# Patient Record
Sex: FEMALE | Race: WHITE | Employment: UNEMPLOYED | URBAN - METROPOLITAN AREA
[De-identification: names, ages, dates, MRNs, and addresses within clinical notes are randomized per-mention and may not be internally consistent; named-entity substitution may affect disease eponyms.]

---

## 2020-11-09 ENCOUNTER — VIRTUAL VISIT (OUTPATIENT)
Dept: HEMATOLOGY | Age: 50
End: 2020-11-09

## 2020-11-09 DIAGNOSIS — R74.8 ELEVATED LIVER ENZYMES: Primary | ICD-10-CM

## 2020-11-09 PROBLEM — F41.9 ANXIETY: Status: ACTIVE | Noted: 2020-11-09

## 2020-11-09 PROBLEM — K21.9 GERD (GASTROESOPHAGEAL REFLUX DISEASE): Status: ACTIVE | Noted: 2020-11-09

## 2020-11-09 PROBLEM — Z90.49 HISTORY OF CHOLECYSTECTOMY: Status: ACTIVE | Noted: 2020-11-09

## 2020-11-09 PROBLEM — I10 HYPERTENSION: Status: ACTIVE | Noted: 2020-11-09

## 2020-11-09 PROBLEM — K43.9 VENTRAL HERNIA: Status: ACTIVE | Noted: 2020-11-09

## 2020-11-09 PROBLEM — J45.909 ASTHMA: Status: ACTIVE | Noted: 2020-11-09

## 2020-11-09 PROCEDURE — 99203 OFFICE O/P NEW LOW 30 MIN: CPT | Performed by: INTERNAL MEDICINE

## 2020-11-09 RX ORDER — ESOMEPRAZOLE MAGNESIUM 40 MG/1
CAPSULE, DELAYED RELEASE ORAL
COMMUNITY
Start: 2020-10-15

## 2020-11-09 RX ORDER — LOSARTAN POTASSIUM 100 MG/1
100 TABLET ORAL DAILY
COMMUNITY
Start: 2020-10-21

## 2020-11-09 RX ORDER — PAROXETINE HYDROCHLORIDE 20 MG/1
40 TABLET, FILM COATED ORAL DAILY
COMMUNITY
Start: 2020-08-18

## 2020-11-09 RX ORDER — DEXAMETHASONE 4 MG/1
TABLET ORAL DAILY
COMMUNITY
Start: 2020-11-02

## 2020-11-09 RX ORDER — AMLODIPINE BESYLATE 5 MG/1
5 TABLET ORAL DAILY
COMMUNITY

## 2020-11-09 RX ORDER — LANOLIN ALCOHOL/MO/W.PET/CERES
325 CREAM (GRAM) TOPICAL DAILY
COMMUNITY
End: 2021-02-23

## 2020-11-09 NOTE — PROGRESS NOTES
VIRTUAL TELEHEALTH VISIT PERFORMED DUE TO COVID-19 EPIDEMIC    CONSENT:  Bishop Cohen, who was seen by synchronous, real-time, audio-video technology, and/or her healthcare decision maker, is aware that this patient-initiated, Telehealth encounter on 11/9/2020 is a billable service, with coverage as determined by her insurance carrier. She is aware that she may receive a bill and has provided verbal consent to proceed. This patient was evaluated during a Virtual Telehealth visit. A caregiver was present if appropriate.  Due to this being a TeleHealth encounter performed during the [facility] public health emergency, the physical examination was limited to that listed in the [facility] Chaz Jones MD, [address], [place], Santhosh Lopez MD, MPH      SALO Blanco, ACNP-BC     April S Luis Enrique, AGPCNP-BC   Allie Guerra, [address], [facility]       [facility] 136    at [address], [facility] 22.    [phone]    FAX: [facility]    at [address], [address]    [phone]    FAX: [phone]       Patient Care Team:  Dutch John Cue as PCP - General (Physician Assistant)  Dutch John Cue (Physician Assistant)      Problem List  Date Reviewed: 11/9/2020          Codes Class Noted    Elevated liver enzymes ICD-10-CM: R74.8  ICD-9-CM: 790.5  11/9/2020        History of cholecystectomy ICD-10-CM: Z90.49  ICD-9-CM: V45.79  11/9/2020        Hypertension ICD-10-CM: I10  ICD-9-CM: 401.9  11/9/2020        Ventral hernia ICD-10-CM: K43.9  ICD-9-CM: 553.20  11/9/2020        Asthma ICD-10-CM: J45.909  ICD-9-CM: 493.90  11/9/2020        GERD (gastroesophageal reflux disease) ICD-10-CM: K21.9  ICD-9-CM: 530.81  11/9/2020        Anxiety ICD-10-CM: F41.9  ICD-9-CM: 300.00  11/9/2020              The clinicians listed above have asked me to see Anna Galo in consultation regarding elevated liver enzymes and its management. All medical records sent by the referring physicians were reviewed including imaging studies     The patient is a 44 y.o.  female who was found to have elevated  liver enzymes in 2015      Serologic evaluation for markers of chronic liver disease was positive for AMA,     MRI of the liver was performed in 9/2020. The results of the imaging suggested fatty liver disease. An assessment of liver fibrosis with biopsy or elastography has not been performed. The patient has the following symptoms which are thought to be due to the liver disease:  fatigue, pain in the right side over the liver, dry mouth, arthralgias, myalgias, itching, swelling of the abdomen,     The patient is not currently experiencing the following symptoms of liver disease:  dry eyes, dry mouth, arthralgias, myalgias, swelling of the lower extremities, hematemesis, hematochezia. The patient has Moderate limitations in functional activities which can be attributed to the liver disease. ASSESSMENT AND PLAN:  Elevated liver enzymes  The patient states she has elevated liver enzymes but there are no liver enzyme results present in the EMR or Care Everywhere. Serologic testing for causes of chronic liver disease were positive for AMA with high titer. The most likely causes for the liver chemistry abnormalities were discussed with the patient and include   Primary Biliary Cholangitis    The need to perform an assessment of liver fibrosis was discussed with the patient. The Fibroscan can assess liver fibrosis and determine if a patient has advanced fibrosis or cirrhosis without the need for liver biopsy. This will be performed at the next office visit. If the Fibroscan suggests advanced fibrosis then a liver biopsy should be considered. The Fibroscan can be repeated annually or as often as clinically indicated to assess for fibrosis progression and/or regression. Will perform laboratory testing to monitor liver function and degree of liver injury. This included BMP, hepatic panel, CBC with platelet count, INR. Screening for Hepatocellular Carcinoma  HCC screening is not necessary if the patient has no evidence of cirrhosis. Treatment of other medical problems in patients with chronic liver disease  There are no contraindications for the patient to take most medications that are necessary for treatment of other medical issues. Counseling for alcohol in patients with chronic liver disease  The patient was counseled regarding alcohol consumption and the effect of alcohol on chronic liver disease. The patient does not consume any significant amount of alcohol. Vaccinations   The need for vaccination against viral hepatitis A and B will be assessed with serologic and instituted as appropriate. Routine vaccinations against other bacterial and viral agents can be performed as indicated. Annual flu vaccination should be administered if indicated. ALLERGIES  Allergies not on file    MEDICATIONS  Current Outpatient Medications   Medication Sig    amLODIPine (NORVASC) 5 mg tablet Take 5 mg by mouth daily.  esomeprazole (NEXIUM) 40 mg capsule TAKE 1 CAPSULE BY MOUTH EVERY DAY    ferrous sulfate 325 mg (65 mg iron) tablet Take 325 mg by mouth daily.  Flovent  mcg/actuation inhaler     losartan (COZAAR) 100 mg tablet     PARoxetine (PAXIL) 20 mg tablet      No current facility-administered medications for this visit. SYSTEM REVIEW NOT RELATED TO LIVER DISEASE OR REVIEWED ABOVE:  Constitution systems: Negative for fever, chills, weight gain, weight loss. Eyes: Negative for visual changes.   ENT: Negative for sore throat, painful swallowing. Respiratory: Negative for cough, hemoptysis, SOB. Cardiology: Negative for chest pain, palpitations. GI:  Negative for constipation or diarrhea. : Negative for urinary frequency, dysuria, hematuria, nocturia. Skin: Negative for rash. Hematology: Negative for easy bruising, blood clots. Musculo-skelatal: Negative for back pain, muscle pain, weakness. Neurologic: Negative for headaches, dizziness, vertigo, memory problems not related to HE. Psychology: Negative for anxiety, depression. FAMILY HISTORY:  The father  of cirrhosis. The mother  of dementia. There is no family history of liver disease. SOCIAL HISTORY:  The patient is . The patient has 2 children, 1 adopted child,   The patient has never used tobacco products. The patient has never consumed significant amounts of alcohol. The patient currently works part time as . PHYSICAL EXAMINATION PERFORMED BY [employer]:  VS: Not performed   General: No acute distress. Eyes: Sclera anicteric. ENT: No oral lesions. Skin: No rashes. spider angiomata. No jaundice. Abdomen: No obvious distention suggesting ascites. Extremities: No edema. No muscle wasting. Neurologic: Alert and oriented. Cranial nerves grossly intact. LABORATORY STUDIES:  From 2020  AST/ALT/ALP/T Bili/ALB:  NA  WBC/HB/PLT/INR:  15/13.1/371  NA/BUN/CREAT:  12/0.6    SEROLOGIES:  2015. HAV total negative, positive, HBsAntigen negative, anti-HCV negative, Ferritin 15, iron saturation 12%, ASMA negative, AMA strongly positive 110, ceruloplsmin 28, alpha-1-antitrypsin 159, TSH 2.46, T4 free 1.2    LIVER HISTOLOGY:  Not available or performed    ENDOSCOPIC PROCEDURES:  Not available or performed    RADIOLOGY:  2020. Dynamic MRI of th liver. Changes consistent with fatty liver. No liver mass lesions. Normal spleen. No ascites.     OTHER TESTING:  Not available or performed    FOLLOW-UP AFTER VIRTUAL VISIT:  Pursuant to the emergency declaration under the [facility] 1135 waiver authority and the [employer] Act, this Virtual  Visit was conducted, with the patient's (and/or their legal guardian's) consent, to reduce the patient's risk of exposure to COVID-19 and provide necessary medical care. Services were provided through a video synchronous discussion virtually to substitute for an in-person clinic visit. The patient was located in their home. The provider was located in the [place] [address] office. All of the issues listed above in the Assessment and Plan were discussed with the patient. All questions were answered. The patient expressed a clear understanding of the above. Orders to obtain laboratory testing will be mailed to the patient and obtained 1 week prior to the next TeleHealth or in-person encounter. An in-person follow-up visit will be scheduled at [place] [address] in 4 weeks for Fibroscan to review all data and determine the treatment plan.       Elzbieta Gray MD  [address] [place] [address] Theresa Thapa, [address]  12 [facility]

## 2020-12-01 ENCOUNTER — HOSPITAL ENCOUNTER (OUTPATIENT)
Dept: LAB | Age: 50
Discharge: HOME | End: 2020-12-01
Payer: COMMERCIAL

## 2020-12-01 DIAGNOSIS — R74.8 ELEVATED LIVER ENZYMES: ICD-10-CM

## 2020-12-01 LAB
ALBUMIN SERPL-MCNC: 3.9 G/DL (ref 3.4–5)
ALBUMIN/GLOB SERPL: 1 {RATIO} (ref 0.8–1.7)
ALP SERPL-CCNC: 193 U/L (ref 45–117)
ALT SERPL-CCNC: 72 U/L (ref 13–56)
ANION GAP SERPL CALC-SCNC: 10 MMOL/L (ref 3–18)
AST SERPL-CCNC: 71 U/L (ref 10–38)
BASOPHILS # BLD: 0.1 K/UL (ref 0–0.1)
BASOPHILS NFR BLD: 1 % (ref 0–2)
BILIRUB DIRECT SERPL-MCNC: 0.2 MG/DL (ref 0–0.2)
BILIRUB SERPL-MCNC: 0.6 MG/DL (ref 0.2–1)
BUN SERPL-MCNC: 11 MG/DL (ref 7–18)
BUN/CREAT SERPL: 16 (ref 12–20)
CALCIUM SERPL-MCNC: 9.8 MG/DL (ref 8.5–10.1)
CHLORIDE SERPL-SCNC: 107 MMOL/L (ref 100–111)
CO2 SERPL-SCNC: 23 MMOL/L (ref 21–32)
CREAT SERPL-MCNC: 0.67 MG/DL (ref 0.6–1.3)
DIFFERENTIAL METHOD BLD: NORMAL
EOSINOPHIL # BLD: 0.3 K/UL (ref 0–0.4)
EOSINOPHIL NFR BLD: 3 % (ref 0–5)
ERYTHROCYTE [DISTWIDTH] IN BLOOD BY AUTOMATED COUNT: 14.2 % (ref 11.6–14.5)
FERRITIN SERPL-MCNC: 191 NG/ML (ref 8–388)
GLOBULIN SER CALC-MCNC: 4 G/DL (ref 2–4)
GLUCOSE SERPL-MCNC: 97 MG/DL (ref 74–99)
HCT VFR BLD AUTO: 43.3 % (ref 35–45)
HGB BLD-MCNC: 14 G/DL (ref 12–16)
INR PPP: 1 (ref 0.8–1.2)
IRON SATN MFR SERPL: 23 % (ref 20–50)
IRON SERPL-MCNC: 84 UG/DL (ref 50–175)
LYMPHOCYTES # BLD: 3.2 K/UL (ref 0.9–3.6)
LYMPHOCYTES NFR BLD: 30 % (ref 21–52)
MCH RBC QN AUTO: 28.8 PG (ref 24–34)
MCHC RBC AUTO-ENTMCNC: 32.3 G/DL (ref 31–37)
MCV RBC AUTO: 89.1 FL (ref 74–97)
MONOCYTES # BLD: 0.8 K/UL (ref 0.05–1.2)
MONOCYTES NFR BLD: 8 % (ref 3–10)
NEUTS SEG # BLD: 6.2 K/UL (ref 1.8–8)
NEUTS SEG NFR BLD: 58 % (ref 40–73)
PLATELET # BLD AUTO: 390 K/UL (ref 135–420)
PMV BLD AUTO: 10.3 FL (ref 9.2–11.8)
POTASSIUM SERPL-SCNC: 3.9 MMOL/L (ref 3.5–5.5)
PROT SERPL-MCNC: 7.9 G/DL (ref 6.4–8.2)
PROTHROMBIN TIME: 12.6 SEC (ref 11.5–15.2)
RBC # BLD AUTO: 4.86 M/UL (ref 4.2–5.3)
SODIUM SERPL-SCNC: 140 MMOL/L (ref 136–145)
TIBC SERPL-MCNC: 359 UG/DL (ref 250–450)
WBC # BLD AUTO: 10.6 K/UL (ref 4.6–13.2)

## 2020-12-01 PROCEDURE — 80048 BASIC METABOLIC PNL TOTAL CA: CPT

## 2020-12-01 PROCEDURE — 85610 PROTHROMBIN TIME: CPT

## 2020-12-01 PROCEDURE — 86038 ANTINUCLEAR ANTIBODIES: CPT

## 2020-12-01 PROCEDURE — 85025 COMPLETE CBC W/AUTO DIFF WBC: CPT

## 2020-12-01 PROCEDURE — 80076 HEPATIC FUNCTION PANEL: CPT

## 2020-12-01 PROCEDURE — 86706 HEP B SURFACE ANTIBODY: CPT

## 2020-12-01 PROCEDURE — 83516 IMMUNOASSAY NONANTIBODY: CPT

## 2020-12-01 PROCEDURE — 86704 HEP B CORE ANTIBODY TOTAL: CPT

## 2020-12-01 PROCEDURE — 36415 COLL VENOUS BLD VENIPUNCTURE: CPT

## 2020-12-01 PROCEDURE — 83540 ASSAY OF IRON: CPT

## 2020-12-01 PROCEDURE — 86708 HEPATITIS A ANTIBODY: CPT

## 2020-12-01 PROCEDURE — 82728 ASSAY OF FERRITIN: CPT

## 2020-12-02 LAB
HBV SURFACE AB SER QL IA: NEGATIVE
HBV SURFACE AB SERPL IA-ACNC: <3.1 MIU/ML
HEP BS AB COMMENT,HBSAC: ABNORMAL

## 2020-12-03 LAB
HAV AB SER QL IA: NEGATIVE
HBV CORE AB SERPL QL IA: NEGATIVE
MITOCHONDRIA M2 IGG SER-ACNC: 181.3 UNITS (ref 0–20)

## 2020-12-04 LAB — ANA TITR SER IF: NEGATIVE {TITER}

## 2020-12-15 ENCOUNTER — OFFICE VISIT (OUTPATIENT)
Dept: HEMATOLOGY | Age: 50
End: 2020-12-15
Payer: COMMERCIAL

## 2020-12-15 VITALS
WEIGHT: 284.38 LBS | DIASTOLIC BLOOD PRESSURE: 99 MMHG | HEART RATE: 98 BPM | OXYGEN SATURATION: 98 % | TEMPERATURE: 98.1 F | SYSTOLIC BLOOD PRESSURE: 133 MMHG

## 2020-12-15 DIAGNOSIS — K74.3 PRIMARY BILIARY CHOLANGITIS (HCC): Primary | ICD-10-CM

## 2020-12-15 PROCEDURE — 99214 OFFICE O/P EST MOD 30 MIN: CPT | Performed by: INTERNAL MEDICINE

## 2020-12-15 PROCEDURE — 91200 LIVER ELASTOGRAPHY: CPT | Performed by: INTERNAL MEDICINE

## 2020-12-15 RX ORDER — FEXOFENADINE HYDROCHLORIDE AND PSEUDOEPHEDRINE HYDROCHLORIDE 180; 240 MG/1; MG/1
1 TABLET, FILM COATED, EXTENDED RELEASE ORAL DAILY
COMMUNITY

## 2020-12-15 NOTE — Clinical Note
12/23/2020 Patient: Su Dinero YOB: 1970 Date of Visit: 12/15/2020 Andrew Darby PA-C 
[address] Fax: [phone] Dear Andrew Darby PA-C, Thank you for referring Ms. Su Dinero to [facility] [place] [facility]"" Katiuska Chopra for evaluation. My notes for this consultation are attached. If you have questions, please do not hesitate to call me. I look forward to following your patient along with you. Sincerely, Bhavana Gautam MD

## 2020-12-15 NOTE — PROGRESS NOTES
Yann Gray [address]      Tawanna Izaguirre MD, Kiera Day, Gabriela Bunch MD, MPH      Makayla Zaragoza, SALO Corral, [facility]-BC     April S Luis Enrique, [facility]   Ann Cruz, P-C    Tristen Benson, [facility]       Felecia Melaniesusan [facility] De Garcia 136    at [address], [address] [place] [address], [facility] 22.    [phone]    FAX: [address] [place] [address], [address]    [phone]    FAX: [phone]       Patient Care Team:  Brett Guerrero as PCP - General (Physician Assistant)      Problem List  Date Reviewed: 11/9/2020          Codes Class Noted    Primary biliary cholangitis [facility]) ICD-10-CM: K74.3  ICD-9-CM: 571.6  11/9/2020        History of cholecystectomy ICD-10-CM: Z90.49  ICD-9-CM: V45.79  11/9/2020        Hypertension ICD-10-CM: I10  ICD-9-CM: 401.9  11/9/2020        Ventral hernia ICD-10-CM: K43.9  ICD-9-CM: 553.20  11/9/2020        Asthma ICD-10-CM: J45.909  ICD-9-CM: 493.90  11/9/2020        GERD (gastroesophageal reflux disease) ICD-10-CM: K21.9  ICD-9-CM: 530.81  11/9/2020        Anxiety ICD-10-CM: F41.9  ICD-9-CM: 300.00  11/9/2020              Greyson Gutierrez [facility] for management of Primary Biliary Cholangitis. The active problem list, all pertinent past medical history, medications, radiologic findings and laboratory findings related to the liver disorder were reviewed with the patient. The patient is a 44 y.o.  female who was found to have elevated  liver enzymes in 2015      Serologic evaluation for markers of chronic liver disease was positive for AMA,     MRI of the liver was performed in 9/2020. The results of the imaging suggested fatty liver disease.       Assessment of liver fibrosis with Fibroscan was performed in the office today. The result was 20.7 kPa which correlates with cirrhosis. The CAP score of 337 suggests fatty liver. The patient has the following symptoms which are thought to be due to the liver disease:  fatigue, pain in the right side over the liver, dry mouth, arthralgias, myalgias, itching, swelling of the abdomen,     The patient is not currently experiencing the following symptoms of liver disease:  dry eyes, dry mouth, arthralgias, myalgias, swelling of the lower extremities, hematemesis, hematochezia. The patient has Moderate limitations in functional activities which can be attributed to the liver disease. ASSESSMENT AND PLAN:  Primary Biliary Cholangitis  The diagnosis is based upon serology, and an elevation in ALP. A liver biopsy will be scheduled. Assessment of liver fibrosis was performed with Fibroscan in 12/2020. The result was 20.7 kPa which correlates with cirrhosis. The CAP score of 337 suggested this may be due to fatty liver. Liver transaminases are elevated. ALP is elevated. Liver function is normal.  The platelet count is normal.      Based upon laboratory studies Fibroscan, and imaging the patient may have advanced liver disease. Since there is discordance between labs and Fibroscan will need to perform a liver biopsy to help determine the cause and severity of the liver test abnormalities. The risks of performing the liver biopsy including pain, puncture of the lung, gallbladder, intestine or kidney and bleeding were discussed. The patient has decided to have a liver biopsy. This will be scheduled. Will hold off on starting ISABELLE until after the liver biopsy. Screening for Hepatocellular Carcinoma  HCC screening is not necessary if the patient has no evidence of cirrhosis.     Treatment of other medical problems in patients with chronic liver disease  There are no contraindications for the patient to take most medications that are necessary for treatment of other medical issues. Counseling for alcohol in patients with chronic liver disease  The patient was counseled regarding alcohol consumption and the effect of alcohol on chronic liver disease. The patient does not consume any significant amount of alcohol. Vaccinations   The need for vaccination against viral hepatitis A and B will be assessed with serologic and instituted as appropriate. Routine vaccinations against other bacterial and viral agents can be performed as indicated. Annual flu vaccination should be administered if indicated. ALLERGIES  Not on File    MEDICATIONS  Current Outpatient Medications   Medication Sig    fexofenadine-pseudoephedrine (Allegra-D 24 Hour) 180-240 mg per tablet Take 1 Tab by mouth daily.  amLODIPine (NORVASC) 5 mg tablet Take 5 mg by mouth daily.  esomeprazole (NEXIUM) 40 mg capsule TAKE 1 CAPSULE BY MOUTH EVERY DAY    ferrous sulfate 325 mg (65 mg iron) tablet Take 325 mg by mouth daily.  Flovent  mcg/actuation inhaler     losartan (COZAAR) 100 mg tablet     PARoxetine (PAXIL) 20 mg tablet 40 mg. No current facility-administered medications for this visit. SYSTEM REVIEW NOT RELATED TO LIVER DISEASE OR REVIEWED ABOVE:  Constitution systems: Negative for fever, chills, weight gain, weight loss. Eyes: Negative for visual changes. ENT: Negative for sore throat, painful swallowing. Respiratory: Negative for cough, hemoptysis, SOB. Cardiology: Negative for chest pain, palpitations. GI:  Negative for constipation or diarrhea. : Negative for urinary frequency, dysuria, hematuria, nocturia. Skin: Negative for rash. Hematology: Negative for easy bruising, blood clots. Musculo-skelatal: Negative for back pain, muscle pain, weakness. Neurologic: Negative for headaches, dizziness, vertigo, memory problems not related to HE. Psychology: Negative for anxiety, depression. FAMILY HISTORY:  The father  of cirrhosis. The mother  of dementia. There is no family history of liver disease. SOCIAL HISTORY:  The patient is . The patient has 2 children, 1 adopted child,   The patient has never used tobacco products. The patient has never consumed significant amounts of alcohol. The patient currently works part time as . PHYSICAL EXAMINATION PERFORMED BY VIRTUAL TELEHEALTH:  VS: Not performed   General: No acute distress. Eyes: Sclera anicteric. ENT: No oral lesions. Skin: No rashes. spider angiomata. No jaundice. Abdomen: No obvious distention suggesting ascites. Extremities: No edema. No muscle wasting. Neurologic: Alert and oriented. Cranial nerves grossly intact. LABORATORY STUDIES:  Liver [place] [address] & Units 2020   WBC 4.6 - 13.2 K/uL 10.6   ANC 1.8 - 8.0 K/UL 6.2   HGB 12.0 - 16.0 g/dL 14.0    - 420 K/uL 390   INR 0.8 - 1.2   1.0   AST 10 - 38 U/L 71 (H)   ALT 13 - 56 U/L 72 (H)   Alk Phos 45 - 117 U/L 193 (H)   Bili, Total 0.2 - 1.0 MG/DL 0.6   Bili, Direct 0.0 - 0.2 MG/DL 0.2   Albumin 3.4 - 5.0 g/dL 3.9   BUN 7.0 - 18 MG/DL 11   Creat 0.6 - 1.3 MG/DL 0.67   Na 136 - 145 mmol/L 140   K 3.5 - 5.5 mmol/L 3.9   Cl 100 - 111 mmol/L 107   CO2 21 - 32 mmol/L 23   Glucose 74 - 99 mg/dL 97       SEROLOGIES:  2015.   HAV total negative, positive, HBsAntigen negative, anti-HCV negative, Ferritin 15, iron saturation 12%, ASMA negative, AMA strongly positive 110, ceruloplsmin 28, alpha-1-antitrypsin 159, TSH 2.46, T4 free 1.2    Serologies Latest Ref Rng & Units 2020   Hep A Ab, Total Negative   Negative   Hep B Core Ab, Total Negative   Negative   Hep B Surface Ab >10.0 mIU/mL <3.10 (L)   Hep B Surface Ab Interp POS   Negative (A)   Ferritin 8 - 388 NG/   Iron % Saturation 20 - 50 % 23   VALERIA, IFA  Negative   M2 Ab 0.0 - 20.0 Units 181.3 (H)     LIVER HISTOLOGY:  12/20202. FibroScan performed at [address]. EkPa was 20.7. IQR/med 22%. . The results suggested a fibrosis level of F4. The CAP score suggests fatty liver    ENDOSCOPIC PROCEDURES:  Not available or performed    RADIOLOGY:  5/2018. Ultrasound of liver. Echogenic consistent with fatty liver. No liver mass lesions. No dilated bile ducts. No ascites. 9/2020. CT scan abdomen with IV contrast.  Changes consistent with fatty liver. No liver mass lesions. Normal appearing liver with areas of focal fat. Normal spleen. No ascites. 9/2020. Dynamic MRI of th liver. Changes consistent with fatty liver. No liver mass lesions. Normal spleen. No ascites. OTHER TESTING:  Not available or performed    FOLLOW-UP:  All of the issues listed above in the Assessment and Plan were discussed with the patient. All questions were answered. The patient expressed a clear understanding of the above. [facility] 87 in 2 weeks after liver biopsy.       Jane Molina MD  [address]  4 [facility], [address] [place] [address], [address] [place] [address] Street [address]  12 [facility]

## 2020-12-23 PROBLEM — K74.3 PRIMARY BILIARY CHOLANGITIS (HCC): Status: ACTIVE | Noted: 2020-11-09

## 2020-12-28 DIAGNOSIS — Z01.812 PRE-PROCEDURE LAB EXAM: Primary | ICD-10-CM

## 2021-01-07 ENCOUNTER — HOSPITAL ENCOUNTER (OUTPATIENT)
Dept: PREADMISSION TESTING | Age: 51
Discharge: HOME | End: 2021-01-07
Payer: COMMERCIAL

## 2021-01-07 PROCEDURE — 87635 SARS-COV-2 COVID-19 AMP PRB: CPT

## 2021-01-08 LAB — SARS-COV-2, COV2NT: NOT DETECTED

## 2021-01-13 ENCOUNTER — HOSPITAL ENCOUNTER (OUTPATIENT)
Age: 51
Setting detail: OUTPATIENT SURGERY
Discharge: HOME | End: 2021-01-13
Attending: INTERNAL MEDICINE | Admitting: INTERNAL MEDICINE
Payer: COMMERCIAL

## 2021-01-13 ENCOUNTER — HOSPITAL ENCOUNTER (OUTPATIENT)
Dept: ULTRASOUND IMAGING | Age: 51
Discharge: HOME | End: 2021-01-13
Attending: INTERNAL MEDICINE
Payer: COMMERCIAL

## 2021-01-13 VITALS
SYSTOLIC BLOOD PRESSURE: 144 MMHG | HEART RATE: 70 BPM | BODY MASS INDEX: 44.27 KG/M2 | WEIGHT: 282.06 LBS | DIASTOLIC BLOOD PRESSURE: 85 MMHG | HEIGHT: 67 IN | RESPIRATION RATE: 16 BRPM | OXYGEN SATURATION: 97 % | TEMPERATURE: 97.6 F

## 2021-01-13 DIAGNOSIS — R79.89 ELEVATED LFTS: ICD-10-CM

## 2021-01-13 DIAGNOSIS — K74.3 PRIMARY BILIARY CHOLANGITIS (HCC): ICD-10-CM

## 2021-01-13 LAB — HCG UR QL: NEGATIVE

## 2021-01-13 PROCEDURE — 76705 ECHO EXAM OF ABDOMEN: CPT

## 2021-01-13 PROCEDURE — 88313 SPECIAL STAINS GROUP 2: CPT

## 2021-01-13 PROCEDURE — 2709999900 HC NON-CHARGEABLE SUPPLY: Performed by: INTERNAL MEDICINE

## 2021-01-13 PROCEDURE — 74011250636 HC RX REV CODE- 250/636: Performed by: INTERNAL MEDICINE

## 2021-01-13 PROCEDURE — 76942 ECHO GUIDE FOR BIOPSY: CPT | Performed by: INTERNAL MEDICINE

## 2021-01-13 PROCEDURE — 47000 NEEDLE BIOPSY OF LIVER PERQ: CPT | Performed by: INTERNAL MEDICINE

## 2021-01-13 PROCEDURE — 77030013826 HC NDL BIOP MAXCOR BARD -B: Performed by: INTERNAL MEDICINE

## 2021-01-13 PROCEDURE — 81025 URINE PREGNANCY TEST: CPT

## 2021-01-13 PROCEDURE — 76040000019: Performed by: INTERNAL MEDICINE

## 2021-01-13 PROCEDURE — 74011000250 HC RX REV CODE- 250: Performed by: INTERNAL MEDICINE

## 2021-01-13 PROCEDURE — 88307 TISSUE EXAM BY PATHOLOGIST: CPT

## 2021-01-13 RX ORDER — LIDOCAINE HYDROCHLORIDE 10 MG/ML
10 INJECTION INFILTRATION; PERINEURAL ONCE
Status: COMPLETED | OUTPATIENT
Start: 2021-01-13 | End: 2021-01-13

## 2021-01-13 RX ORDER — HYDROMORPHONE HYDROCHLORIDE 2 MG/ML
1 INJECTION, SOLUTION INTRAMUSCULAR; INTRAVENOUS; SUBCUTANEOUS
Status: DISCONTINUED | OUTPATIENT
Start: 2021-01-13 | End: 2021-01-13 | Stop reason: HOSPADM

## 2021-01-13 RX ORDER — ONDANSETRON 2 MG/ML
4 INJECTION INTRAMUSCULAR; INTRAVENOUS
Status: DISCONTINUED | OUTPATIENT
Start: 2021-01-13 | End: 2021-01-13 | Stop reason: HOSPADM

## 2021-01-13 RX ORDER — SODIUM CHLORIDE 0.9 % (FLUSH) 0.9 %
5-40 SYRINGE (ML) INJECTION EVERY 8 HOURS
Status: CANCELLED | OUTPATIENT
Start: 2021-01-13

## 2021-01-13 RX ORDER — SODIUM CHLORIDE 0.9 % (FLUSH) 0.9 %
5-40 SYRINGE (ML) INJECTION AS NEEDED
Status: CANCELLED | OUTPATIENT
Start: 2021-01-13

## 2021-01-13 RX ORDER — IBUPROFEN 200 MG
400 TABLET ORAL
COMMUNITY

## 2021-01-13 RX ADMIN — HYDROMORPHONE HYDROCHLORIDE 1 MG: 2 INJECTION, SOLUTION INTRAMUSCULAR; INTRAVENOUS; SUBCUTANEOUS at 08:51

## 2021-01-13 RX ADMIN — ONDANSETRON 4 MG: 2 INJECTION INTRAMUSCULAR; INTRAVENOUS at 08:49

## 2021-01-13 NOTE — PROCEDURES
Rodolfo Lopes MD, [place], Bal Adams MD, MPH      Tha Verde, PAYULISSA Palmer, [facility]-BC     Nicolasa Dudley, [facility]   Ashley العلي P-C    Pollock Prema, [facility]       Feleciaamalia [facility] 136    at [address], [facility] Sridevi Menezes  22.    [phone]    FAX: [address] [place] [address], [address] [place] [address] - [address]    [phone]    FAX: [phone]         LIVER BIOPSY PROCEDURE NOTE    Wilma Chamorro  1970    INDICATIONS/PRE-OPERATIVE  DIAGNOSIS:  PBC    : Lanie Ceja MD    SURGICAL ASSISTANT:  None    PROSTHETIC DEVICES, TISSUE GRAFTS, TRANSPLANTED ORGANS:  Not applicable    SEDATION: 1% Lidocaine injection 20 ml    PROCEDURE:  Informed consent to perform the procedure was obtained from the patient. The patient was positioned on the edge of the stretcher lying flat in the supine position. Ultrasound was utilized to image the liver. The diaphragm and any major mass lesion or vascular structures within the liver were identified. An appropriate site for liver biopsy was identified. The distance from the surface of the skin to the liver capsule was 5 cm. This area was prepped with betadine and draped in sterile fashion. The skin was infiltrated with 1% lidocaine. The deeper subcutanous tissues and liver capsule overlying the biopsy site were then infiltrated with 1% lidocaine until appropriate anesthesia was obtained. A small incision was made in the skin so the biopsy devise could be easily inserted. A total of 2 passes with the 16 gauge Bard biopsy devise was then made into the liver. Core(s) of liver tissue totaling 4 cm in length were obtained and placed into tissue fixative. A band aid was placed over the biopsy site. The patient was then repositioned on the right side and transported to the recovery area on the stretcher for routine monitoring until discharge. The specimen was sent to pathology for processing via the normal transport mechanism. SPECIMEN COLLECTED: Liver    INTERVENTIONS:  None    ESTIMATED BLOOD LOSS: Negligible.      POST-OPERATIVE DIAGNOSIS: Same as Pre-operative Diagnosis      Yudi Brown MD  [address]  [address] [place] [address] Rd, [address]  [address] [place] [address]

## 2021-01-13 NOTE — PROGRESS NOTES
Katie Brar MD, Cely Bueno MD, MPH      Kelley Duffy, PAYULISSA Salas, [facility]     Nicolasa Dudley, [facility]-BC   Madeline Esquivel, ADAL-TITI Murillo, [facility]       Felecia Leach [address]    at [place] [address], [address] Sridevi Menezes  22.    [phone]    FAX: [address] [place] [address], [address]    [phone]    FAX: [phone]         PRE-PROCEDURE NOTE - LIVER BIOPSY    H and P from last office visit reviewed. Allergies reviewed. Out-patient medication list reviewed. Patient Active Problem List   Diagnosis Code    Primary biliary cholangitis (HCC) K74.3    History of cholecystectomy Z90.49    Hypertension I10    Ventral hernia K43.9    Asthma J45.909    GERD (gastroesophageal reflux disease) K21.9    Anxiety F41.9       Allergies   Allergen Reactions    Lisinopril Anaphylaxis    Ciprofibrate Nausea and Vomiting       No current facility-administered medications on file prior to encounter. Current Outpatient Medications on File Prior to Encounter   Medication Sig Dispense Refill    ibuprofen (Motrin IB) 200 mg tablet Take 400 mg by mouth every eight (8) hours as needed for Pain.  OTHER CDC OIL      fexofenadine-pseudoephedrine (Allegra-D 24 Hour) 180-240 mg per tablet Take 1 Tab by mouth daily.  amLODIPine (NORVASC) 5 mg tablet Take 5 mg by mouth daily.  ferrous sulfate 325 mg (65 mg iron) tablet Take 325 mg by mouth daily.  Flovent  mcg/actuation inhaler Take  by inhalation daily.  losartan (COZAAR) 100 mg tablet 100 mg daily.  PARoxetine (PAXIL) 20 mg tablet Take 40 mg by mouth daily.       esomeprazole (NEXIUM) 40 mg capsule TAKE 1 CAPSULE BY MOUTH EVERY DAY         For liver biopsy to assess Primary Biliary Cholangitis. The risks of the procedure were discussed with the patient. This included bleeding, pain, and puncture of other organs. All questions were answered. The patient wishes to proceed with the procedure. The patient was counseled at length about the risks of rosas Covid-19 in the miah-operative and post-operative states including the recovery window of their procedure. The patient was made aware that rosas Covid-19 after a surgical procedure may worsen their prognosis for recovering from the virus and lend to a higher morbidity and or mortality risk. The patient was given the options of postponing their procedure. All of the risks, benefits, and alternatives were discussed. The patient does  wish to proceed with the procedure. PHYSICAL EXAMINATION:  Visit Vitals  BP (!) 158/95   Pulse 83   Temp 97.9 °F (36.6 °C)   Resp 16   Ht 5' 7\" (1.702 m)   Wt 282 lb 1 oz (127.9 kg)   LMP 12/13/2020 Comment: urine pregnancy is    SpO2 99%   Breastfeeding No   BMI 44.18 kg/m²       General: No acute distress. Eyes: Sclera anicteric. ENT: No oral lesions. Thyroid normal.  Nodes: No adenopathy. Skin: No spider angiomata. No jaundice. No palmar erythema. Respiratory: Lungs clear to auscultation. Cardiovascular: Regular heart rate. No murmurs. No JVD. Abdomen: Soft non-tender, liver size normal to percussion/palpation. Spleen not palpable. No obvious ascites. Extremities: No edema. No muscle wasting. No gross arthritic changes. Neurologic: Alert and oriented. Cranial nerves grossly intact. No asterixis.       LABS:  Lab Results   Component Value Date/Time    WBC 10.6 12/01/2020 12:20 PM    HGB 14.0 12/01/2020 12:20 PM    HCT 43.3 12/01/2020 12:20 PM    PLATELET 451 54/91/5688 12:20 PM    MCV 89.1 12/01/2020 12:20 PM     Lab Results   Component Value Date/Time    INR 1.0 12/01/2020 12:20 PM Prothrombin time 12.6 12/01/2020 12:20 PM       ASSESSMENT AND PLAN:  Liver biopsy under ultrasound guidance.     Sirena Niño MD  [address]  [address] [facility], [address] [place] [address] Theresa Thapa, [address] [place] [address] Street [address]  12 [facility]

## 2021-01-13 NOTE — PERIOP NOTES
Face to face  Discharged  instruction given to spouse, and pt and both agreed   with the plan. Discharged includes diet, activity limitations , medication to continue and f/u appointment. D/c to home in stable condition with care of family. Pt came in with chronic pain- advised to take extra strength tylenol for pain- call Doctor [facility]  If does not help and -encouraged  Pt to take  2 days off from work- pt is a house keeper (  to prevent lifting any heavy object AND PT AGREED. All belongings are accounted. Asya Santos

## 2021-01-13 NOTE — DISCHARGE INSTRUCTIONS
DISCHARGE SUMMARY from Nurse    PATIENT INSTRUCTIONS:    After general anesthesia or intravenous sedation, for 24 hours or while taking prescription Narcotics:  · Limit your activities  · Do not drive and operate hazardous machinery  · Do not make important personal or business decisions  · Do  not drink alcoholic beverages  · If you have not urinated within 8 hours after discharge, please contact your surgeon on call. Report the following to your surgeon:  · Excessive pain, swelling, redness or odor of or around the surgical area  · Temperature over 100.5  · Nausea and vomiting lasting longer than 4 hours or if unable to take medications  · Any signs of decreased circulation or nerve impairment to extremity: change in color, persistent  numbness, tingling, coldness or increase pain  · Any questions    What to do at Home:  Recommended activity: as above     If you experience any of the following symptoms as above , please follow up with [facility]. *  Please give a list of your current medications to your Primary Care Provider. *  Please update this list whenever your medications are discontinued, doses are      changed, or new medications (including over-the-counter products) are added. *  Please carry medication information at all times in case of emergency situations. These are general instructions for a healthy lifestyle:    No smoking/ No tobacco products/ Avoid exposure to second hand smoke  Surgeon General's Warning:  Quitting smoking now greatly reduces serious risk to your health.     Obesity, smoking, and sedentary lifestyle greatly increases your risk for illness    A healthy diet, regular physical exercise & weight monitoring are important for maintaining a healthy lifestyle    You may be retaining fluid if you have a history of heart failure or if you experience any of the following symptoms:  Weight gain of 3 pounds or more overnight or 5 pounds in a week, increased swelling in our hands or feet or shortness of breath while lying flat in bed. Please call your doctor as soon as you notice any of these symptoms; do not wait until your next office visit. The discharge information has been reviewed with the patient and spouse. The patient and spouse verbalized understanding. Discharge medications reviewed with the patient and spouse and appropriate educational materials and side effects teaching were provided. ___________________________________________________________________________________________________________________________________[address] MD Lavonne, Lorrayne Closs, Jacky Cobian MD, MPH      Sea Walker, PA-TITI Aguilar, ACNP-BC     Nicolasa Dudley, AGPCNP-BC   Lizett Willis, FNP-TITI Joy, [facility]       Felecia Javed [facility] 136    at [address], [facility] Sridevi Menezes  22.    [phone]    FAX: [address] [place] [address], [address] [place] [address] - [address]    [phone]    FAX: [phone]         LIVER BIOPSY [address] [place] [address]  1970  Date: 1/13/2021    DIET:    Alisha Calderón may resume your previous diet. ACTIVITIES:  Rest quietly the rest of today. You should not lift any objects more than 20 pounds for the next 2 days. If you work sitting down without strenuous activity you may return to work tomorrow. If you exert yourself or do heavy lifting at work you should take tomorrow off. Do not drive or operate hazardous machinery for 12 hours after you are discharged from this procedure. SPECIAL INSTRUCTIONS:  Do not use any aspirin or non-steroidal (Motin, Advil, Naproxen, etc) pain medications for the next 2 days.   You may use extra-strength Tylenol (acetaminophen) if you experience pain or discomfort later today. Restarting blood thinners: If you were taking blood thinners prior to the procedure you can restart these in 2 days. Call the [facility] office if you experience any of the following:  Persistent or severe abdominal pain. Persistent or severe abdominal distention. Fever and chills   Nausea and vomiting. New or unusual symptoms. Follow-up care: You should have a follow up appointment with Dr. Alejo Grant to review the results of the liver biopsy results in 2 weeks. If you do not have an appointment please call the office at the number listed above to schedule this. Other instructions: If you have any problems or questions call the [facility] office at the phone number listed above. DISCHARGE SUMMARY from Nurse: The following personal items collected during your admission are returned to you:   Dental Appliance: Dental Appliances: None  Vision: Visual Aid: Glasses  Hearing Aid:    Jewelry:    Clothing:    Other Valuables:    Valuables sent to safe:            Learning About Coronavirus (COVID-19)  Coronavirus (COVID-19): Overview  What is coronavirus ([id number])? The coronavirus disease (COVID-19) is caused by a virus. It is an illness that was first found in [place], [place], in December 2019. It has since spread worldwide. The virus can cause fever, cough, and trouble breathing. In severe cases, it can cause pneumonia and make it hard to breathe without help. It can cause death. Coronaviruses are a large group of viruses. They cause the common cold. They also cause more serious illnesses like Middle East respiratory syndrome (MERS) and severe acute respiratory syndrome (SARS). COVID-19 is caused by a novel coronavirus. That means it's a new type that has not been seen in people before. This virus spreads person-to-person through droplets from coughing and sneezing.  It can also spread when you are close to someone who is infected. And it can spread when you touch something that has the virus on it, such as a doorknob or a tabletop. What can you do to protect yourself from coronavirus (COVID-19)? The best way to protect yourself from getting sick is to:  · Avoid areas where there is an outbreak. · Avoid contact with people who may be infected. · Wash your hands often with soap or alcohol-based hand sanitizers. · Avoid crowds and try to stay at least 6 feet away from other people. · Wash your hands often, especially after you cough or sneeze. Use soap and water, and scrub for at least 20 seconds. If soap and water aren't available, use an alcohol-based hand . · Avoid touching your mouth, nose, and eyes. What can you do to avoid spreading the virus to others? To help avoid spreading the virus to others:  · Cover your mouth with a tissue when you cough or sneeze. Then throw the tissue in the trash. · Use a disinfectant to clean things that you touch often. · Stay home if you are sick or have been exposed to the virus. Don't go to school, work, or public areas. And don't use public transportation. · If you are sick:  ? Leave your home only if you need to get medical care. But call the doctor's office first so they know you're coming. And wear a face mask, if you have one.  ? If you have a face mask, wear it whenever you're around other people. It can help stop the spread of the virus when you cough or sneeze. ? Clean and disinfect your home every day. Use household  and disinfectant wipes or sprays. Take special care to clean things that you grab with your hands. These include doorknobs, remote controls, phones, and handles on your refrigerator and microwave. And don't forget countertops, tabletops, bathrooms, and computer keyboards. When to call for help  Call 911 anytime you think you may need emergency care. For example, call if:  · You have severe trouble breathing.  (You can't talk at all.)  · You have constant chest pain or pressure. · You are severely dizzy or lightheaded. · You are confused or can't think clearly. · Your face and lips have a blue color. · You pass out (lose consciousness) or are very hard to wake up. Call your doctor now if you develop symptoms such as:  · Shortness of breath. · Fever. · Cough. If you need to get care, call ahead to the doctor's office for instructions before you go. Make sure you wear a face mask, if you have one, to prevent exposing other people to the virus. Where can you get the latest information? The following health organizations are tracking and studying this virus. Their websites contain the most up-to-date information. Mervat Mena also learn what to do if you think you may have been exposed to the virus. · U.S. Centers for Disease Control and Prevention (CDC): The CDC provides updated news about the disease and travel advice. The website also tells you how to prevent the spread of infection. www.cdc.gov  · World Health Organization [facility]): WHO offers information about the virus outbreaks. WHO also has travel advice. www.who.int  Current as of: April 1, 2020               Content Version: 12.4  © 2006-2020 [employer]. Care instructions adapted under license by your healthcare professional. If you have questions about a medical condition or this instruction, always ask your healthcare professional. [address] any warranty or liability for your use of this information.   Patient armband removed and shredded

## 2021-01-14 DIAGNOSIS — K74.3 PRIMARY BILIARY CHOLANGITIS (HCC): ICD-10-CM

## 2021-01-25 ENCOUNTER — OFFICE VISIT (OUTPATIENT)
Dept: HEMATOLOGY | Age: 51
End: 2021-01-25
Payer: COMMERCIAL

## 2021-01-25 VITALS
SYSTOLIC BLOOD PRESSURE: 142 MMHG | WEIGHT: 282 LBS | OXYGEN SATURATION: 97 % | HEIGHT: 67 IN | BODY MASS INDEX: 44.26 KG/M2 | RESPIRATION RATE: 16 BRPM | DIASTOLIC BLOOD PRESSURE: 87 MMHG | HEART RATE: 83 BPM | TEMPERATURE: 97 F

## 2021-01-25 DIAGNOSIS — K75.81 NASH (NONALCOHOLIC STEATOHEPATITIS): ICD-10-CM

## 2021-01-25 DIAGNOSIS — K74.3 PRIMARY BILIARY CHOLANGITIS (HCC): Primary | ICD-10-CM

## 2021-01-25 PROCEDURE — 99214 OFFICE O/P EST MOD 30 MIN: CPT | Performed by: INTERNAL MEDICINE

## 2021-01-25 RX ORDER — URSODIOL 500 MG/1
1000 TABLET, FILM COATED ORAL 2 TIMES DAILY
Qty: 120 TAB | Refills: 3 | Status: SHIPPED | OUTPATIENT
Start: 2021-01-25 | End: 2021-07-03

## 2021-01-25 NOTE — Clinical Note
2/7/2021 Patient: Supa Reza YOB: 1970 Date of Visit: 1/25/2021 Jhon Duffy PA-C 
[address] Fax: [phone] Dear Jhon Duffy PA-C, Thank you for referring Ms. Supa Reza to [address] Katiuska Chopra for evaluation. My notes for this consultation are attached. If you have questions, please do not hesitate to call me. I look forward to following your patient along with you. Sincerely, Wendy Pham MD

## 2021-02-07 PROBLEM — K74.60 CIRRHOSIS (HCC): Status: ACTIVE | Noted: 2021-02-07

## 2021-02-07 PROBLEM — K75.81 NASH (NONALCOHOLIC STEATOHEPATITIS): Status: ACTIVE | Noted: 2021-02-07

## 2021-02-11 ENCOUNTER — APPOINTMENT (OUTPATIENT)
Dept: GENERAL RADIOLOGY | Age: 51
End: 2021-02-11
Attending: EMERGENCY MEDICINE
Payer: COMMERCIAL

## 2021-02-11 ENCOUNTER — HOSPITAL ENCOUNTER (EMERGENCY)
Age: 51
Discharge: HOME | End: 2021-02-11
Attending: EMERGENCY MEDICINE
Payer: COMMERCIAL

## 2021-02-11 VITALS
SYSTOLIC BLOOD PRESSURE: 149 MMHG | RESPIRATION RATE: 18 BRPM | HEIGHT: 67 IN | WEIGHT: 280 LBS | DIASTOLIC BLOOD PRESSURE: 89 MMHG | OXYGEN SATURATION: 97 % | BODY MASS INDEX: 43.95 KG/M2 | HEART RATE: 79 BPM | TEMPERATURE: 97.6 F

## 2021-02-11 DIAGNOSIS — S62.201A CLOSED FRACTURE OF FIRST METACARPAL BONE OF RIGHT HAND, UNSPECIFIED FRACTURE MORPHOLOGY, UNSPECIFIED PORTION OF METACARPAL, INITIAL ENCOUNTER: Primary | ICD-10-CM

## 2021-02-11 DIAGNOSIS — S62.346A NONDISPLACED FRACTURE OF BASE OF FIFTH METACARPAL BONE, RIGHT HAND, INITIAL ENCOUNTER FOR CLOSED FRACTURE: ICD-10-CM

## 2021-02-11 PROCEDURE — 74011250637 HC RX REV CODE- 250/637: Performed by: EMERGENCY MEDICINE

## 2021-02-11 PROCEDURE — 73080 X-RAY EXAM OF ELBOW: CPT

## 2021-02-11 PROCEDURE — 73130 X-RAY EXAM OF HAND: CPT

## 2021-02-11 PROCEDURE — 73090 X-RAY EXAM OF FOREARM: CPT

## 2021-02-11 PROCEDURE — 99283 EMERGENCY DEPT VISIT LOW MDM: CPT

## 2021-02-11 RX ORDER — ACETAMINOPHEN AND CODEINE PHOSPHATE 300; 30 MG/1; MG/1
1 TABLET ORAL
Status: COMPLETED | OUTPATIENT
Start: 2021-02-11 | End: 2021-02-11

## 2021-02-11 RX ORDER — TRAMADOL HYDROCHLORIDE 50 MG/1
50 TABLET ORAL
Qty: 12 TAB | Refills: 0 | Status: SHIPPED | OUTPATIENT
Start: 2021-02-11 | End: 2021-02-14

## 2021-02-11 RX ORDER — TRAMADOL HYDROCHLORIDE 50 MG/1
50 TABLET ORAL
Status: DISCONTINUED | OUTPATIENT
Start: 2021-02-11 | End: 2021-02-11

## 2021-02-11 RX ADMIN — ACETAMINOPHEN AND CODEINE PHOSPHATE 1 TABLET: 300; 30 TABLET ORAL at 00:45

## 2021-02-11 NOTE — ED PROVIDER NOTES
EMERGENCY DEPARTMENT HISTORY AND PHYSICAL EXAM      Date: 2/11/2021  Patient Name: Erick Schultz    History of Presenting Illness     Chief Complaint   Patient presents with    Arm Pain       History Provided By: Patient    HPI: Erick Schultz, 44 y.o. female with a past medical history significant hypertension and Liver disease, GERD, chronic pain, psychiatric disorder presents to the ED with cc of patient fell on an outstretched hand about 2 hours prior to presentation and complains of persistent pain in the right wrist with some duction and range of movement. Pain is aggravated by movement of her hand and wrist.  No other injuries. There are no other complaints, changes, or physical findings at this time. PCP: Danika Mondragon PA-C    No current facility-administered medications on file prior to encounter. Current Outpatient Medications on File Prior to Encounter   Medication Sig Dispense Refill    ursodioL (ISABELLE Forte) 500 mg tablet Take 2 Tabs by mouth two (2) times a day. 120 Tab 3    ibuprofen (Motrin IB) 200 mg tablet Take 400 mg by mouth every eight (8) hours as needed for Pain.  fexofenadine-pseudoephedrine (Allegra-D 24 Hour) 180-240 mg per tablet Take 1 Tab by mouth daily.  amLODIPine (NORVASC) 5 mg tablet Take 5 mg by mouth daily.  esomeprazole (NEXIUM) 40 mg capsule TAKE 1 CAPSULE BY MOUTH EVERY DAY      ferrous sulfate 325 mg (65 mg iron) tablet Take 325 mg by mouth daily.  Flovent  mcg/actuation inhaler Take  by inhalation daily.  losartan (COZAAR) 100 mg tablet 100 mg daily.  PARoxetine (PAXIL) 20 mg tablet Take 40 mg by mouth daily.          Past History     Past Medical History:  Past Medical History:   Diagnosis Date    Chronic pain     GERD (gastroesophageal reflux disease)     Hypertension     Liver disease     Nausea & vomiting     Psychiatric disorder        Past Surgical History:  Past Surgical History:   Procedure Laterality Date    HX APPENDECTOMY      HX CHOLECYSTECTOMY      HX GI      COLONOSCOPY    HX GYN      TUBAL LIGATION    HX HEENT      r ear surgery    HX OTHER SURGICAL      CYST REMOVED ON R OVARIES    HX TONSILLECTOMY         Family History:  History reviewed. No pertinent family history. Social History:  Social History     Tobacco Use    Smoking status: Never Smoker    Smokeless tobacco: Never Used   Substance Use Topics    Alcohol use: Not Currently    Drug use: Yes     Types: Marijuana     Comment: edibles       Allergies: Allergies   Allergen Reactions    Lisinopril Anaphylaxis    Ciprofibrate Nausea and Vomiting         Review of Systems     Review of Systems   Constitutional: Negative for diaphoresis and fatigue. HENT: Negative for congestion, dental problem, ear discharge and ear pain. Eyes: Negative for discharge and redness. Respiratory: Negative for cough, chest tightness and shortness of breath. Cardiovascular: Negative for chest pain and palpitations. Gastrointestinal: Negative for abdominal pain, constipation, diarrhea, nausea and vomiting. Endocrine: Negative. Genitourinary: Negative. Negative for dysuria and frequency. Musculoskeletal: Positive for arthralgias and joint swelling. Negative for myalgias. Right wrist injury   Skin: Negative. Neurological: Negative for dizziness, syncope and light-headedness. Hematological: Negative. Psychiatric/Behavioral: Negative for agitation and behavioral problems. All other systems reviewed and are negative. Physical Exam     Physical Exam  Vitals signs and nursing note reviewed. Constitutional:       Appearance: Normal appearance. She is normal weight. HENT:      Head: Normocephalic and atraumatic. Nose: Nose normal.      Mouth/Throat:      Mouth: Mucous membranes are moist.      Pharynx: Oropharynx is clear. Eyes:      Extraocular Movements: Extraocular movements intact.       Conjunctiva/sclera: Conjunctivae normal.      Pupils: Pupils are equal, round, and reactive to light. Neck:      Musculoskeletal: Normal range of motion and neck supple. Cardiovascular:      Rate and Rhythm: Normal rate and regular rhythm. Pulses: Normal pulses. Heart sounds: Normal heart sounds. Pulmonary:      Effort: Pulmonary effort is normal.      Breath sounds: Normal breath sounds. Abdominal:      General: Abdomen is flat. Palpations: Abdomen is soft. Musculoskeletal: Normal range of motion. General: Swelling, tenderness and signs of injury present. Comments: Right wrist injury   Skin:     General: Skin is warm and dry. Capillary Refill: Capillary refill takes less than 2 seconds. Neurological:      General: No focal deficit present. Mental Status: She is alert and oriented to person, place, and time. Psychiatric:         Mood and Affect: Mood normal.         Behavior: Behavior normal.         Lab and Diagnostic Study Results     Labs -   No results found for this or any previous visit (from the past 12 hour(s)). Radiologic Studies -     CT Results  (Last 48 hours)    None        CXR Results  (Last 48 hours)    None            Medical Decision Making   - I am the first provider for this patient. - I reviewed the vital signs, available nursing notes, past medical history, past surgical history, family history and social history. - Initial assessment performed. The patients presenting problems have been discussed, and they are in agreement with the care plan formulated and outlined with them. I have encouraged them to ask questions as they arise throughout their visit. Vital Signs-Reviewed the patient's vital signs.   Patient Vitals for the past 12 hrs:   Temp Pulse Resp BP SpO2   02/11/21 0234  79 18 (!) 149/89 97 %   02/11/21 0031 97.6 °F (36.4 °C) 76 20 (!) 145/96 96 %       Records Reviewed: Nursing Notes    ED Course/Provider Notes (Medical Decision Making): Uneventful ED course, clinical improvement with therapy, patient will be discharged to followup with PCP and Dr.M. Daniel Miller, orthopedic surgeon for casting. Disposition     Disposition: Condition stable and improved  DC- Adult Discharges: All of the diagnostic tests were reviewed and questions answered. Diagnosis, care plan and treatment options were discussed. The patient understands the instructions and will follow up as directed. The patients results have been reviewed with them. They have been counseled regarding their diagnosis. The patient verbally convey understanding and agreement of the signs, symptoms, diagnosis, treatment and prognosis and additionally agrees to follow up as recommended with their PCP in 24 - 48 hours. They also agree with the care-plan and convey that all of their questions have been answered. I have also put together some discharge instructions for them that include: 1) educational information regarding their diagnosis, 2) how to care for their diagnosis at home, as well a 3) list of reasons why they would want to return to the ED prior to their follow-up appointment, should their condition change. Discharged    DISCHARGE PLAN:  1. Current Discharge Medication List      CONTINUE these medications which have NOT CHANGED    Details   ursodioL (ISABELLE Forte) 500 mg tablet Take 2 Tabs by mouth two (2) times a day. Qty: 120 Tab, Refills: 3      ibuprofen (Motrin IB) 200 mg tablet Take 400 mg by mouth every eight (8) hours as needed for Pain. fexofenadine-pseudoephedrine (Allegra-D 24 Hour) 180-240 mg per tablet Take 1 Tab by mouth daily. amLODIPine (NORVASC) 5 mg tablet Take 5 mg by mouth daily. esomeprazole (NEXIUM) 40 mg capsule TAKE 1 CAPSULE BY MOUTH EVERY DAY      ferrous sulfate 325 mg (65 mg iron) tablet Take 325 mg by mouth daily. Flovent  mcg/actuation inhaler Take  by inhalation daily. losartan (COZAAR) 100 mg tablet 100 mg daily. PARoxetine (PAXIL) 20 mg tablet Take 40 mg by mouth daily. 2.   Follow-up Information     Follow up With Specialties Details Why Contact Info    Abimael Alonso PA-C Physician Assistant In 1 week  [facility]      Johnny Palacios MD Orthopedic Surgery In 1 day  [address]  [phone]          3. Return to ED if worse   4. Discharge Medication List as of 2/11/2021  2:44 AM      START taking these medications    Details   traMADoL (Ultram) 50 mg tablet Take 1 Tab by mouth every six (6) hours as needed for Pain for up to 3 days. Max Daily Amount: 200 mg., Normal, Disp-12 Tab, R-0         CONTINUE these medications which have NOT CHANGED    Details   ursodioL (ISABELLE Forte) 500 mg tablet Take 2 Tabs by mouth two (2) times a day., Normal, Disp-120 Tab, R-3      ibuprofen (Motrin IB) 200 mg tablet Take 400 mg by mouth every eight (8) hours as needed for Pain., Historical Med      fexofenadine-pseudoephedrine (Allegra-D 24 Hour) 180-240 mg per tablet Take 1 Tab by mouth daily. , Historical Med      amLODIPine (NORVASC) 5 mg tablet Take 5 mg by mouth daily. , Historical Med      esomeprazole (NEXIUM) 40 mg capsule TAKE 1 CAPSULE BY MOUTH EVERY DAY, Historical Med      ferrous sulfate 325 mg (65 mg iron) tablet Take 325 mg by mouth daily. , Historical Med      Flovent  mcg/actuation inhaler Take  by inhalation daily. , Historical Med, JON      losartan (COZAAR) 100 mg tablet 100 mg daily. , Historical Med      PARoxetine (PAXIL) 20 mg tablet Take 40 mg by mouth daily. , Historical Med               Diagnosis     Clinical Impression:   1. Closed fracture of first metacarpal bone of right hand, unspecified fracture morphology, unspecified portion of metacarpal, initial encounter    2.  Nondisplaced fracture of base of fifth metacarpal bone, right hand, initial encounter for closed fracture        Attestations:    Donato BECKMAN MD Joe    Please note that this dictation was completed with W4, the computer voice recognition software. Quite often unanticipated grammatical, syntax, homophones, and other interpretive errors are inadvertently transcribed by the computer software. Please disregard these errors. Please excuse any errors that have escaped final proofreading. Thank you.

## 2021-02-11 NOTE — ED NOTES
Sugar tong splint applied to right arm, neuro vascular check intact pre and post splint application.

## 2021-02-11 NOTE — DISCHARGE INSTRUCTIONS
Follow-up with your orthopedic surgeon for further evaluation and treatment. Take medicines as prescribed. Return to emergency room for any new or worsening symptoms.

## 2021-02-11 NOTE — ED TRIAGE NOTES
Reports tripped and fell earlier this evening around 2230 tonight. Landed on right arm, pain in right wrist/hand, forearm some in elbow.  Abrasion right 5th finger with some pain

## 2021-02-23 ENCOUNTER — OFFICE VISIT (OUTPATIENT)
Dept: HEMATOLOGY | Age: 51
End: 2021-02-23
Payer: COMMERCIAL

## 2021-02-23 ENCOUNTER — HOSPITAL ENCOUNTER (OUTPATIENT)
Dept: LAB | Age: 51
Discharge: HOME | End: 2021-02-23
Payer: COMMERCIAL

## 2021-02-23 VITALS
SYSTOLIC BLOOD PRESSURE: 153 MMHG | OXYGEN SATURATION: 93 % | WEIGHT: 279.25 LBS | HEART RATE: 97 BPM | TEMPERATURE: 98.2 F | BODY MASS INDEX: 43.74 KG/M2 | DIASTOLIC BLOOD PRESSURE: 101 MMHG

## 2021-02-23 DIAGNOSIS — K74.3 HEPATIC CIRRHOSIS DUE TO PRIMARY BILIARY CHOLANGITIS (HCC): Primary | ICD-10-CM

## 2021-02-23 DIAGNOSIS — K74.3 HEPATIC CIRRHOSIS DUE TO PRIMARY BILIARY CHOLANGITIS (HCC): ICD-10-CM

## 2021-02-23 LAB
ALBUMIN SERPL-MCNC: 3.6 G/DL (ref 3.4–5)
ALBUMIN/GLOB SERPL: 0.9 {RATIO} (ref 0.8–1.7)
ALP SERPL-CCNC: 194 U/L (ref 45–117)
ALT SERPL-CCNC: 49 U/L (ref 13–56)
ANION GAP SERPL CALC-SCNC: 8 MMOL/L (ref 3–18)
AST SERPL-CCNC: 34 U/L (ref 10–38)
BASOPHILS # BLD: 0 K/UL (ref 0–0.1)
BASOPHILS NFR BLD: 0 % (ref 0–2)
BILIRUB DIRECT SERPL-MCNC: 0.2 MG/DL (ref 0–0.2)
BILIRUB SERPL-MCNC: 0.4 MG/DL (ref 0.2–1)
BUN SERPL-MCNC: 14 MG/DL (ref 7–18)
BUN/CREAT SERPL: 16 (ref 12–20)
CALCIUM SERPL-MCNC: 9.1 MG/DL (ref 8.5–10.1)
CHLORIDE SERPL-SCNC: 106 MMOL/L (ref 100–111)
CO2 SERPL-SCNC: 28 MMOL/L (ref 21–32)
CREAT SERPL-MCNC: 0.86 MG/DL (ref 0.6–1.3)
DIFFERENTIAL METHOD BLD: ABNORMAL
EOSINOPHIL # BLD: 0.3 K/UL (ref 0–0.4)
EOSINOPHIL NFR BLD: 3 % (ref 0–5)
ERYTHROCYTE [DISTWIDTH] IN BLOOD BY AUTOMATED COUNT: 13.9 % (ref 11.6–14.5)
GLOBULIN SER CALC-MCNC: 4.1 G/DL (ref 2–4)
GLUCOSE SERPL-MCNC: 165 MG/DL (ref 74–99)
HCT VFR BLD AUTO: 43.5 % (ref 35–45)
HGB BLD-MCNC: 13.8 G/DL (ref 12–16)
INR PPP: 1 (ref 0.8–1.2)
LYMPHOCYTES # BLD: 3.8 K/UL (ref 0.9–3.6)
LYMPHOCYTES NFR BLD: 37 % (ref 21–52)
MCH RBC QN AUTO: 29.5 PG (ref 24–34)
MCHC RBC AUTO-ENTMCNC: 31.7 G/DL (ref 31–37)
MCV RBC AUTO: 92.9 FL (ref 74–97)
MONOCYTES # BLD: 0.7 K/UL (ref 0.05–1.2)
MONOCYTES NFR BLD: 7 % (ref 3–10)
NEUTS SEG # BLD: 5.5 K/UL (ref 1.8–8)
NEUTS SEG NFR BLD: 53 % (ref 40–73)
PLATELET # BLD AUTO: 402 K/UL (ref 135–420)
PMV BLD AUTO: 10.3 FL (ref 9.2–11.8)
POTASSIUM SERPL-SCNC: 4.1 MMOL/L (ref 3.5–5.5)
PROT SERPL-MCNC: 7.7 G/DL (ref 6.4–8.2)
PROTHROMBIN TIME: 13.3 SEC (ref 11.5–15.2)
RBC # BLD AUTO: 4.68 M/UL (ref 4.2–5.3)
SODIUM SERPL-SCNC: 142 MMOL/L (ref 136–145)
WBC # BLD AUTO: 10.4 K/UL (ref 4.6–13.2)

## 2021-02-23 PROCEDURE — 99215 OFFICE O/P EST HI 40 MIN: CPT | Performed by: NURSE PRACTITIONER

## 2021-02-23 PROCEDURE — 80076 HEPATIC FUNCTION PANEL: CPT

## 2021-02-23 PROCEDURE — 85025 COMPLETE CBC W/AUTO DIFF WBC: CPT

## 2021-02-23 PROCEDURE — 82107 ALPHA-FETOPROTEIN L3: CPT

## 2021-02-23 PROCEDURE — 85610 PROTHROMBIN TIME: CPT

## 2021-02-23 PROCEDURE — 80048 BASIC METABOLIC PNL TOTAL CA: CPT

## 2021-02-23 PROCEDURE — 86803 HEPATITIS C AB TEST: CPT

## 2021-02-23 PROCEDURE — 36415 COLL VENOUS BLD VENIPUNCTURE: CPT

## 2021-02-23 NOTE — PROGRESS NOTES
Fauzia Bateman MD, [address], [place] Malhotra MD, MPH      Rebekah Miramontes, PA-TITI Moore, [facility]     Nicolasa Dudley, [facility]   Oswald Mccall, P-C    Irlanda Hartman, [facility]       Felecia Deputado [address]    at [address], [facility], [place]  22.    [phone]    FAX: [phone]    at [address], [address]    [phone]    FAX: [phone]       Patient Care Team:  Tammy Raphael as PCP - General (Physician Assistant)      Problem List  Date Reviewed: 2/23/2021          Codes Class Noted    BLANCO (nonalcoholic steatohepatitis) ICD-10-CM: K75.81  ICD-9-CM: 571.8  2/7/2021        Cirrhosis ([facility] [address] ICD-10-CM: K74.60  ICD-9-CM: 571.5  2/7/2021        Primary biliary cholangitis ([facility] [address] ICD-10-CM: K74.3  ICD-9-CM: 571.6  11/9/2020        History of cholecystectomy ICD-10-CM: Z90.49  ICD-9-CM: V45.79  11/9/2020        Hypertension ICD-10-CM: I10  ICD-9-CM: 401.9  11/9/2020        Ventral hernia ICD-10-CM: K43.9  ICD-9-CM: 553.20  11/9/2020        Asthma ICD-10-CM: J45.909  ICD-9-CM: 493.90  11/9/2020        GERD (gastroesophageal reflux disease) ICD-10-CM: K21.9  ICD-9-CM: 530.81  11/9/2020        Anxiety ICD-10-CM: F41.9  ICD-9-CM: 300.00  11/9/2020                Fly Flores is being seen at [address] for management of Primary Biliary Cholangitis and BLANCO. The active problem list, all pertinent past medical history, medications, radiologic findings and laboratory findings related to the liver disorder were reviewed with the patient. The patient is a 44 y.o.   female who was found to have elevated  liver enzymes in 2015      Serologic evaluation for markers of chronic liver disease was positive for AMA      MRI of the liver was performed in 9/2020. The results of the imaging suggested fatty liver disease. The patient underwent a liver biopsy in 1/2021. The procedure was well tolerated. I have personally reviewed the liver biopsy slides. This demonstrates bile duct changes consistent with PBC and florid bile duct lesions. There is also BLANCO with 66-75% steatosis, severe inflammation, severe ballooning, Miguelina bodies and cirrhosis. The patient has the following symptoms which are thought to be due to the liver disease:  fatigue, pain in the right side over the liver, dry mouth, arthralgias, myalgias, itching, swelling of the abdomen,     The patient is not currently experiencing the following symptoms of liver disease:  dry eyes, dry mouth, arthralgias, myalgias, swelling of the lower extremities, hematemesis, hematochezia. The patient has Moderate limitations in functional activities which can be attributed to the liver disease. ASSESSMENT AND PLAN:  Primary Biliary Cholangitis  The diagnosis is based upon liver biopsy, serology, and an elevation in ALP. A liver biopsy was performed in 1/2021. This demonstrated bile duct lesions consistent with PBC, co-existent BLANCO and cirrhosis. Assessment of liver fibrosis was performed with Fibroscan in 12/2020. The result was 20.7 kPa which correlates with cirrhosis. The CAP score of 337 suggested this may be due to fatty liver. Liver transaminases are elevated. ALP is elevated. Liver function is normal.  The platelet count is normal.    The patient will be started on ISABELLE at a dose of 1000 mg BID. The side effects of ISABELLE including a 2% risk of itching and a 2% risk of diarrhea were discussed. BLANCO  The diagnosis is based upon liver biopsy, Fiboscan CAP score,     A liver biopsy performed in 1/2021 shows severe BLANCO with ARABELLA score (332) and cirrhosis.   There is also changes consistent with PBC.  Elastography performed in 12/2020 suggests Cirrhosis and fatty liver. Liver transaminases are elevated. ALP is elevated. Liver function is normal.  The platelet count is normal.    If the patient looses 20% of current body weight, which is 56 pounds, down to a weight of of 230 pounds, all steatosis will have resolved. Once all steatosis has resolved all inflammation will resolve. Then all fibrosis will gradually resolve and the liver could eventually be normal.    She has two separate liver diseases and liver biopsy suggests that the BLANCO is severe. It is imperative that she loose weight to at least remove one factor causing liver disease progression. Screening for Esophageal varices   The patient has not had an EGD to screen for varices. Will schedule for EGD to assess for varices. EGD ordered. Counseling for diet and weight loss in patients with confirmed or suspected NAFLD  The patient was counseled regarding diet and exercise to achieve weight loss. The best diet for patients with fatty liver is one very low in carbohydrates and enriched with protein such as an Julienne's program.      The patient was told not to consume any food products and drinks containing fructose as this enhances hepatic fat synthesis. There is no medication or vitamin supplements that we advocate for BLANCO. Using glitazones in patients without diabetes mellitus has been shown to reduce fat content in the liver but has no effect on fibrosis and is associated with weight gain. Vitamin E has also been used but the data is not very good and most experts no longer advocate this. Screening for Hepatocellular Carcinoma  HCC screening has recently been performed and does not suggest [facility] [address] The next liver imaging study will be performed in 3/2021. Ultrasound and AFP-L3% were ordered.     Treatment of other medical problems in patients with chronic liver disease  There are no contraindications for the patient to take most medications that are necessary for treatment of other medical issues. The patient has cirrhrosis and should avoid taking NSAIDs which are associated with a higher rate of developing MARCUS. The patient can take any medications utilized for treatment of DM, Statins to treat hypercholesterolemia. The patient does not comsume alcohol on a daily basis. Normal doses of acetaminophen, as recommended on the label of the bottle, are not hepatotoxic except in the setting of daily alcohol use, even in patients with cirrhosis and can be utilized for pain. Counseling for alcohol in patients with chronic liver disease  The patient was counseled regarding alcohol consumption and the effect of alcohol on chronic liver disease. The patient does not consume any significant amount of alcohol. Vaccinations   Vaccination for viral hepatitis A and B is recommended since the patient has no serologic evidence of previous exposure or vaccination with immunity. Routine vaccinations against other bacterial and viral agents can be performed as indicated. Annual flu vaccination should be administered if indicated. ALLERGIES  Allergies   Allergen Reactions    Lisinopril Anaphylaxis    Ciprofibrate Nausea and Vomiting       MEDICATIONS  Current Outpatient Medications   Medication Sig    ursodioL (ISABELLE Forte) 500 mg tablet Take 2 Tabs by mouth two (2) times a day.  ibuprofen (Motrin IB) 200 mg tablet Take 400 mg by mouth every eight (8) hours as needed for Pain.  fexofenadine-pseudoephedrine (Allegra-D 24 Hour) 180-240 mg per tablet Take 1 Tab by mouth daily.  amLODIPine (NORVASC) 5 mg tablet Take 5 mg by mouth daily.  esomeprazole (NEXIUM) 40 mg capsule TAKE 1 CAPSULE BY MOUTH EVERY DAY    Flovent  mcg/actuation inhaler Take  by inhalation daily.  losartan (COZAAR) 100 mg tablet 100 mg daily.  PARoxetine (PAXIL) 20 mg tablet Take 40 mg by mouth daily.      No current facility-administered medications for this visit. SYSTEM REVIEW NOT RELATED TO LIVER DISEASE OR REVIEWED ABOVE:  Constitution systems: Negative for fever, chills, weight gain, weight loss. Eyes: Negative for visual changes. ENT: Negative for sore throat, painful swallowing. Respiratory: Negative for cough, hemoptysis, SOB. Cardiology: Negative for chest pain, palpitations. GI:  Negative for constipation or diarrhea. : Negative for urinary frequency, dysuria, hematuria, nocturia. Skin: Negative for rash. Hematology: Negative for easy bruising, blood clots. Musculo-skelatal: Negative for back pain, muscle pain, weakness. Neurologic: Negative for headaches, dizziness, vertigo, memory problems not related to HE. Psychology: Negative for anxiety, depression. FAMILY HISTORY:  The father  of cirrhosis. The mother  of dementia. There is no family history of liver disease. SOCIAL HISTORY:  The patient is . The patient has 2 children, 1 adopted child,   The patient has never used tobacco products. The patient has never consumed significant amounts of alcohol. The patient currently works part time as . General: No acute distress. Eyes: Sclera anicteric. ENT: No oral lesions. Thyroid normal.  Nodes: No adenopathy. Skin: No spider angiomata. No jaundice. No palmar erythema. Respiratory: Lungs clear to auscultation. Cardiovascular: Regular heart rate. No murmurs. No JVD. Abdomen: Soft non-tender. Liver size normal to percussion/palpation. Spleen not palpable. No obvious ascites. Extremities: No edema. No muscle wasting. No gross arthritic changes. Neurologic: Alert and oriented. Cranial nerves grossly intact. No asterixis.     LABORATORY STUDIES:  Liver [place] of [address] & Units 2021   WBC 4.6 - 13.2 K/uL 10.4 10.6   ANC 1.8 - 8.0 K/UL 5.5 6.2   HGB 12.0 - 16.0 g/dL 13.8 14.0    - 420 K/uL 402 390 INR 0.8 - 1.2   1.0 1.0   AST 10 - 38 U/L 34 71 (H)   ALT 13 - 56 U/L 49 72 (H)   Alk Phos 45 - 117 U/L 194 (H) 193 (H)   Bili, Total 0.2 - 1.0 MG/DL 0.4 0.6   Bili, Direct 0.0 - 0.2 MG/DL 0.2 0.2   Albumin 3.4 - 5.0 g/dL 3.6 3.9   BUN 7.0 - 18 MG/DL 14 11   Creat 0.6 - 1.3 MG/DL 0.86 0.67   Na 136 - 145 mmol/L 142 140   K 3.5 - 5.5 mmol/L 4.1 3.9   Cl 100 - 111 mmol/L 106 107   CO2 21 - 32 mmol/L 28 23   Glucose 74 - 99 mg/dL 165 (H) 97     Cancer Screening Latest Ref Rng & Units 2/23/2021   AFP, Serum 0.0 - 8.0 ng/mL 6.3   AFP-L3% 0.0 - 9.9 % Comment     SEROLOGIES:  4/2015. HAV total negative, positive, HBsAntigen negative, anti-HCV negative, Ferritin 15, iron saturation 12%, ASMA negative, AMA strongly positive 110, ceruloplsmin 28, alpha-1-antitrypsin 159, TSH 2.46, T4 free 1.2    Serologies Latest Ref Rng & Units 12/1/2020   Hep A Ab, Total Negative   Negative   Hep B Core Ab, Total Negative   Negative   Hep B Surface Ab >10.0 mIU/mL <3.10 (L)   Hep B Surface Ab Interp POS   Negative (A)   Ferritin 8 - 388 NG/   Iron % Saturation 20 - 50 % 23   VALERIA, IFA  Negative   M2 Ab 0.0 - 20.0 Units 181.3 (H)     LIVER HISTOLOGY:  12/2020. FibroScan performed at [address]. EkPa was 20.7. IQR/med 22%. . The results suggested a fibrosis level of F4. The CAP score suggests fatty liver  1/2021. Slides reviewed by MLS. BLANCO. 66-75% macrovesicualr and micovesicular steatosis, Severe inflammation, Severe ballooning, Stage 4 fibrosis. ARABELLA (332). Bile duct changes consistent with PBC including florid bile duct lesions. Cirrhosis. ENDOSCOPIC PROCEDURES:  Not available or performed    RADIOLOGY:  5/2018. Ultrasound of liver. Echogenic consistent with fatty liver. No liver mass lesions. No dilated bile ducts. No ascites. 9/2020. CT scan abdomen with IV contrast.  Changes consistent with fatty liver. No liver mass lesions. Normal appearing liver with areas of focal fat. Normal spleen. No ascites. 9/2020. Dynamic MRI of th liver. Changes consistent with fatty liver. No liver mass lesions. Normal spleen. No ascites. OTHER TESTING:  Not available or performed    FOLLOW-UP:  All of the issues listed above in the Assessment and Plan were discussed with the patient. All questions were answered. The patient expressed a clear understanding of the above. [address] in 3 months.      ANA iVllalobos  Liver [place] of [facility]  [facility], [address] [facility]   [address], [address] [place] [address]   [phone]

## 2021-02-24 LAB
AFP L3 MFR SERPL: NORMAL % (ref 0–9.9)
AFP SERPL-MCNC: 6.3 NG/ML (ref 0–8)
HCV AB S/CO SERPL IA: <0.1 S/CO RATIO (ref 0–0.9)
HCV AB SERPL QL IA: NORMAL

## 2021-04-01 ENCOUNTER — HOSPITAL ENCOUNTER (OUTPATIENT)
Dept: PREADMISSION TESTING | Age: 51
Discharge: HOME | End: 2021-04-01
Payer: COMMERCIAL

## 2021-04-01 ENCOUNTER — HOSPITAL ENCOUNTER (OUTPATIENT)
Dept: ULTRASOUND IMAGING | Age: 51
Discharge: HOME | End: 2021-04-01
Payer: COMMERCIAL

## 2021-04-01 DIAGNOSIS — K74.3 HEPATIC CIRRHOSIS DUE TO PRIMARY BILIARY CHOLANGITIS (HCC): ICD-10-CM

## 2021-04-01 PROCEDURE — U0003 INFECTIOUS AGENT DETECTION BY NUCLEIC ACID (DNA OR RNA); SEVERE ACUTE RESPIRATORY SYNDROME CORONAVIRUS 2 (SARS-COV-2) (CORONAVIRUS DISEASE [COVID-19]), AMPLIFIED PROBE TECHNIQUE, MAKING USE OF HIGH THROUGHPUT TECHNOLOGIES AS DESCRIBED BY CMS-2020-01-R: HCPCS

## 2021-04-01 PROCEDURE — 76700 US EXAM ABDOM COMPLETE: CPT

## 2021-04-02 LAB — SARS-COV-2, COV2NT: NOT DETECTED

## 2021-04-05 ENCOUNTER — ANESTHESIA (OUTPATIENT)
Dept: ENDOSCOPY | Age: 51
End: 2021-04-05
Payer: COMMERCIAL

## 2021-04-05 ENCOUNTER — HOSPITAL ENCOUNTER (OUTPATIENT)
Age: 51
Setting detail: OUTPATIENT SURGERY
Discharge: HOME | End: 2021-04-05
Attending: INTERNAL MEDICINE | Admitting: INTERNAL MEDICINE
Payer: COMMERCIAL

## 2021-04-05 ENCOUNTER — OFFICE VISIT (OUTPATIENT)
Dept: HEMATOLOGY | Age: 51
End: 2021-04-05

## 2021-04-05 ENCOUNTER — ANESTHESIA EVENT (OUTPATIENT)
Dept: ENDOSCOPY | Age: 51
End: 2021-04-05
Payer: COMMERCIAL

## 2021-04-05 VITALS
DIASTOLIC BLOOD PRESSURE: 88 MMHG | RESPIRATION RATE: 16 BRPM | HEIGHT: 67 IN | WEIGHT: 282 LBS | SYSTOLIC BLOOD PRESSURE: 138 MMHG | TEMPERATURE: 98 F | HEART RATE: 70 BPM | OXYGEN SATURATION: 96 % | BODY MASS INDEX: 44.26 KG/M2

## 2021-04-05 DIAGNOSIS — K29.70 GASTRITIS WITHOUT BLEEDING, UNSPECIFIED CHRONICITY, UNSPECIFIED GASTRITIS TYPE: ICD-10-CM

## 2021-04-05 LAB — HCG UR QL: NEGATIVE

## 2021-04-05 PROCEDURE — 43239 EGD BIOPSY SINGLE/MULTIPLE: CPT | Performed by: INTERNAL MEDICINE

## 2021-04-05 PROCEDURE — 88342 IMHCHEM/IMCYTCHM 1ST ANTB: CPT

## 2021-04-05 PROCEDURE — 81025 URINE PREGNANCY TEST: CPT

## 2021-04-05 PROCEDURE — 74011000250 HC RX REV CODE- 250: Performed by: ANESTHESIOLOGY

## 2021-04-05 PROCEDURE — 2709999900 HC NON-CHARGEABLE SUPPLY: Performed by: INTERNAL MEDICINE

## 2021-04-05 PROCEDURE — 74011250636 HC RX REV CODE- 250/636: Performed by: INTERNAL MEDICINE

## 2021-04-05 PROCEDURE — 74011250636 HC RX REV CODE- 250/636: Performed by: ANESTHESIOLOGY

## 2021-04-05 PROCEDURE — 76060000031 HC ANESTHESIA FIRST 0.5 HR: Performed by: INTERNAL MEDICINE

## 2021-04-05 PROCEDURE — 76040000019: Performed by: INTERNAL MEDICINE

## 2021-04-05 PROCEDURE — 88305 TISSUE EXAM BY PATHOLOGIST: CPT

## 2021-04-05 RX ORDER — SODIUM CHLORIDE 9 MG/ML
100 INJECTION, SOLUTION INTRAVENOUS CONTINUOUS
Status: DISCONTINUED | OUTPATIENT
Start: 2021-04-05 | End: 2021-04-05 | Stop reason: HOSPADM

## 2021-04-05 RX ORDER — ATROPINE SULFATE 0.1 MG/ML
0.5 INJECTION INTRAVENOUS
Status: CANCELLED | OUTPATIENT
Start: 2021-04-05 | End: 2021-04-06

## 2021-04-05 RX ORDER — EPINEPHRINE 0.1 MG/ML
1 INJECTION INTRACARDIAC; INTRAVENOUS
Status: CANCELLED | OUTPATIENT
Start: 2021-04-05 | End: 2021-04-06

## 2021-04-05 RX ORDER — NALOXONE HYDROCHLORIDE 0.4 MG/ML
0.4 INJECTION, SOLUTION INTRAMUSCULAR; INTRAVENOUS; SUBCUTANEOUS
Status: CANCELLED | OUTPATIENT
Start: 2021-04-05 | End: 2021-04-05

## 2021-04-05 RX ORDER — PREDNISONE 10 MG/1
10 TABLET ORAL SEE ADMIN INSTRUCTIONS
COMMUNITY
End: 2021-05-27 | Stop reason: ALTCHOICE

## 2021-04-05 RX ORDER — SODIUM CHLORIDE 0.9 % (FLUSH) 0.9 %
5-40 SYRINGE (ML) INJECTION EVERY 8 HOURS
Status: CANCELLED | OUTPATIENT
Start: 2021-04-05

## 2021-04-05 RX ORDER — FLUMAZENIL 0.1 MG/ML
0.2 INJECTION INTRAVENOUS
Status: CANCELLED | OUTPATIENT
Start: 2021-04-05 | End: 2021-04-05

## 2021-04-05 RX ORDER — LIDOCAINE HYDROCHLORIDE 20 MG/ML
INJECTION, SOLUTION EPIDURAL; INFILTRATION; INTRACAUDAL; PERINEURAL AS NEEDED
Status: DISCONTINUED | OUTPATIENT
Start: 2021-04-05 | End: 2021-04-05 | Stop reason: HOSPADM

## 2021-04-05 RX ORDER — SODIUM CHLORIDE 0.9 % (FLUSH) 0.9 %
5-40 SYRINGE (ML) INJECTION AS NEEDED
Status: CANCELLED | OUTPATIENT
Start: 2021-04-05

## 2021-04-05 RX ORDER — PROPOFOL 10 MG/ML
INJECTION, EMULSION INTRAVENOUS AS NEEDED
Status: DISCONTINUED | OUTPATIENT
Start: 2021-04-05 | End: 2021-04-05 | Stop reason: HOSPADM

## 2021-04-05 RX ORDER — DEXTROMETHORPHAN/PSEUDOEPHED 2.5-7.5/.8
1.2 DROPS ORAL
Status: CANCELLED | OUTPATIENT
Start: 2021-04-05

## 2021-04-05 RX ADMIN — PROPOFOL 150 MG: 10 INJECTION, EMULSION INTRAVENOUS at 09:54

## 2021-04-05 RX ADMIN — LIDOCAINE HYDROCHLORIDE 20 MG: 20 INJECTION, SOLUTION EPIDURAL; INFILTRATION; INTRACAUDAL; PERINEURAL at 09:55

## 2021-04-05 RX ADMIN — SODIUM CHLORIDE: 900 INJECTION, SOLUTION INTRAVENOUS at 09:55

## 2021-04-05 RX ADMIN — SODIUM CHLORIDE 100 ML/HR: 900 INJECTION, SOLUTION INTRAVENOUS at 09:31

## 2021-04-05 NOTE — ANESTHESIA POSTPROCEDURE EVALUATION
Procedure(s):  EGD WITH MAC; BIOPSY.     MAC    Anesthesia Post Evaluation      Multimodal analgesia: multimodal analgesia not used between 6 hours prior to anesthesia start to PACU discharge  Patient location during evaluation: bedside  Level of consciousness: awake  Pain management: adequate  Airway patency: patent  Anesthetic complications: no  Cardiovascular status: acceptable  Respiratory status: acceptable  Hydration status: acceptable  Post anesthesia nausea and vomiting:  none  Final Post Anesthesia Temperature Assessment:  Normothermia (36.0-37.5 degrees C)      INITIAL Post-op Vital signs:   Vitals Value Taken Time   /86 04/05/21 1001   Temp     Pulse 89 04/05/21 1001   Resp 8 04/05/21 1001   SpO2 95 % 04/05/21 1001

## 2021-04-05 NOTE — DISCHARGE INSTRUCTIONS
Minnie Jerry MD, Mitch Fernandez MD, MPH      Jaspreet Newman, PA-C Candance Sar, [facility]-BC     Nicolasa Dudley, [facility]   Ama Acosta, ADAL-TITI    Zain Williamson, [facility]       Felecia Wade [facility] 136    at [address]    [address] [place] [address], [facility] Sridevi Menezes  22.    [phone]    FAX: [address] [place] [address], [address]    [phone]    FAX: [phone]         ENDOSCOPY [id number] [facility],[address]  1970  Date: 4/5/2021    DISCOMFORT:  Use lozenges or warm salt water gargle for sore thoat  Apply warm compress to IV site if red. If redness or soreness persists call the office. You may experience gas and bloating. Walking and belching will help relieve this. You may experience chest discomfort or pressure especially if banding of esophageal varices was performed. DIET:  You may resume your normal diet. ACTIVITY:  Spend the remainder of the day resting. Avoid any strenuous activity. You may not operate a vehicle for 12 hours. You may not engage in an occupation involving machinery or appliances for rest of today. Avoid making any critical or financial decisions for at least 24 hour. Call the [facility] of [phone] if you have any of the following:  Increasing chest or abdominal pain, nausea, vomiting, vomiting blood, abdominal distension or swelling, fever or chills, bloody discharge from nose or mouth or shortness of breath. Follow-up Instructions:  Call Dr. Marce Menon for any questions or problems at the phone number listed above. If a biopsy was performed, you will be contacted by the office staff or Dr Marce Menon within 1 week.    If you have not heard from us by then you may call the office at the phone number listed above to inquire about the results. ENDOSCOPY FINDINGS:  Your endoscopy demonstrated mild irritation. A biopsy of the stomach was taken. Will repeat EGD to look for development of varices in 3 years. Keep follow-up appointment with Bruna Calle on 5/27/2021. DISCHARGE SUMMARY from the Nurse: The following personal items collected during your admission are returned to you:   Dental Appliance: Dental Appliances: None  Vision: Visual Aid: Glasses, With patient  Hearing Aid:    Jewelry:    Clothing:    Other Valuables:    Valuables sent to safe:                          Learning About Coronavirus (COVID-19)  Coronavirus (COVID-19): Overview  What is coronavirus ([id number])? The coronavirus disease (COVID-19) is caused by a virus. It is an illness that was first found in [place], [place], in December 2019. It has since spread worldwide. The virus can cause fever, cough, and trouble breathing. In severe cases, it can cause pneumonia and make it hard to breathe without help. It can cause death. Coronaviruses are a large group of viruses. They cause the common cold. They also cause more serious illnesses like Middle East respiratory syndrome (MERS) and severe acute respiratory syndrome (SARS). COVID-19 is caused by a novel coronavirus. That means it's a new type that has not been seen in people before. This virus spreads person-to-person through droplets from coughing and sneezing. It can also spread when you are close to someone who is infected. And it can spread when you touch something that has the virus on it, such as a doorknob or a tabletop. What can you do to protect yourself from coronavirus (COVID-19)? The best way to protect yourself from getting sick is to:  · Avoid areas where there is an outbreak. · Avoid contact with people who may be infected. · Wash your hands often with soap or alcohol-based hand sanitizers.   · Avoid crowds and try to stay at least 6 feet away from other people. · Wash your hands often, especially after you cough or sneeze. Use soap and water, and scrub for at least 20 seconds. If soap and water aren't available, use an alcohol-based hand . · Avoid touching your mouth, nose, and eyes. What can you do to avoid spreading the virus to others? To help avoid spreading the virus to others:  · Cover your mouth with a tissue when you cough or sneeze. Then throw the tissue in the trash. · Use a disinfectant to clean things that you touch often. · Stay home if you are sick or have been exposed to the virus. Don't go to school, work, or public areas. And don't use public transportation. · If you are sick:  ? Leave your home only if you need to get medical care. But call the doctor's office first so they know you're coming. And wear a face mask, if you have one.  ? If you have a face mask, wear it whenever you're around other people. It can help stop the spread of the virus when you cough or sneeze. ? Clean and disinfect your home every day. Use household  and disinfectant wipes or sprays. Take special care to clean things that you grab with your hands. These include doorknobs, remote controls, phones, and handles on your refrigerator and microwave. And don't forget countertops, tabletops, bathrooms, and computer keyboards. When to call for help  Call 911 anytime you think you may need emergency care. For example, call if:  · You have severe trouble breathing. (You can't talk at all.)  · You have constant chest pain or pressure. · You are severely dizzy or lightheaded. · You are confused or can't think clearly. · Your face and lips have a blue color. · You pass out (lose consciousness) or are very hard to wake up. Call your doctor now if you develop symptoms such as:  · Shortness of breath. · Fever. · Cough. If you need to get care, call ahead to the doctor's office for instructions before you go.  Make sure you wear a face mask, if you have one, to prevent exposing other people to the virus. Where can you get the latest information? The following health organizations are tracking and studying this virus. Their websites contain the most up-to-date information. Kera Wesley also learn what to do if you think you may have been exposed to the virus. · U.S. Centers for Disease Control and Prevention (CDC): The CDC provides updated news about the disease and travel advice. The website also tells you how to prevent the spread of infection. www.cdc.gov  · World Health Organization [facility]): WHO offers information about the virus outbreaks. WHO also has travel advice. www.who.int  Current as of: April 1, 2020               Content Version: 12.4  © 2006-2020 Healthwise, Incorporated. Care instructions adapted under license by your healthcare professional. If you have questions about a medical condition or this instruction, always ask your healthcare professional. [place] [address] any warranty or liability for your use of this information. DISCHARGE SUMMARY from Nurse    PATIENT INSTRUCTIONS:    After general anesthesia or intravenous sedation, for 24 hours or while taking prescription Narcotics:  · Limit your activities  · Do not drive and operate hazardous machinery  · Do not make important personal or business decisions  · Do  not drink alcoholic beverages  · If you have not urinated within 8 hours after discharge, please contact your surgeon on call.     Report the following to your surgeon:  · Excessive pain, swelling, redness or odor of or around the surgical area  · Temperature over 100.5  · Nausea and vomiting lasting longer than 4 hours or if unable to take medications  · Any signs of decreased circulation or nerve impairment to extremity: change in color, persistent  numbness, tingling, coldness or increase pain  · Any questions    What to do at Home:  Recommended activity: AS ABOVE,     If you experience any of the following symptoms AS ABOVE, please follow up with DR. FLETCHER. *  Please give a list of your current medications to your Primary Care Provider. *  Please update this list whenever your medications are discontinued, doses are      changed, or new medications (including over-the-counter products) are added. *  Please carry medication information at all times in case of emergency situations. These are general instructions for a healthy lifestyle:    No smoking/ No tobacco products/ Avoid exposure to second hand smoke  Surgeon General's Warning:  Quitting smoking now greatly reduces serious risk to your health. Obesity, smoking, and sedentary lifestyle greatly increases your risk for illness    A healthy diet, regular physical exercise & weight monitoring are important for maintaining a healthy lifestyle    You may be retaining fluid if you have a history of heart failure or if you experience any of the following symptoms:  Weight gain of 3 pounds or more overnight or 5 pounds in a week, increased swelling in our hands or feet or shortness of breath while lying flat in bed. Please call your doctor as soon as you notice any of these symptoms; do not wait until your next office visit. The discharge information has been reviewed with the patient. The patient verbalized understanding. Discharge medications reviewed with the patient and appropriate educational materials and side effects teaching were provided.   ___________________________________________________________________________________________________________________________________    Patient armband removed and shredded

## 2021-04-05 NOTE — H&P
Jazmyne Roach MD, Ever Sims MD, MPH      Romi Bartholomew, SALO Calix, [facility]-BC     Nicolasa Dudlye, [facility]   Bulmaro Culver P-TITI Mckinnon, [facility]       Felecia Javed [facility] 136    at [address], [facility] Sridevi Menezes  22.    [phone]    FAX: [address] [place] [address], [address]    [phone]    FAX: [phone]         PRE-PROCEDURE NOTE - EGD    H and P from last office visit reviewed. Allergies reviewed. Out-patient medicaton list reviewed. Patient Active Problem List   Diagnosis Code    Primary biliary cholangitis (HCC) K74.3    History of cholecystectomy Z90.49    Hypertension I10    Ventral hernia K43.9    Asthma J45.909    GERD (gastroesophageal reflux disease) K21.9    Anxiety F41.9    BLANCO (nonalcoholic steatohepatitis) K75.81    Cirrhosis (HCC) K74.60       Allergies   Allergen Reactions    Lisinopril Anaphylaxis    Ciprofibrate Nausea and Vomiting       No current facility-administered medications on file prior to encounter. Current Outpatient Medications on File Prior to Encounter   Medication Sig Dispense Refill    predniSONE (STERAPRED DS) 10 mg dose pack Take 10 mg by mouth See Admin Instructions. See administration instruction per 10mg dose pack      ursodioL (ISABELLE Forte) 500 mg tablet Take 2 Tabs by mouth two (2) times a day. 120 Tab 3    ibuprofen (Motrin IB) 200 mg tablet Take 400 mg by mouth every eight (8) hours as needed for Pain.  fexofenadine-pseudoephedrine (Allegra-D 24 Hour) 180-240 mg per tablet Take 1 Tab by mouth daily.  amLODIPine (NORVASC) 5 mg tablet Take 5 mg by mouth daily.       esomeprazole (NEXIUM) 40 mg capsule TAKE 1 CAPSULE BY MOUTH EVERY DAY      Flovent  mcg/actuation inhaler Take  by inhalation daily.  losartan (COZAAR) 100 mg tablet 100 mg daily.  PARoxetine (PAXIL) 20 mg tablet Take 40 mg by mouth daily. For EGD to assess for esophageal and gastric varices. Plan to perform banding if indicated based upon variceal size and appearance. The risks of the procedure were discussed with the patient. These included reaction to anesthesia, pain, perforation and bleeding. All questions were answered. The patient wishes to proceed with the procedure. The patient was counseled at length about the risks of rosas Covid-19 in the miah-operative and post-operative states including the recovery window of their procedure. The patient was made aware that rosas Covid-19 after a surgical procedure may worsen their prognosis for recovering from the virus and lend to a higher morbidity and or mortality risk. The patient was given the options of postponing their procedure. All of the risks, benefits, and alternatives were discussed. The patient does  wish to proceed with the procedure. PHYSICAL EXAMINATION:  Visit Vitals  BP (!) 173/97   Pulse 84   Temp 96.8 °F (36 °C)   Resp 16   Ht 5' 7\" (1.702 m)   Wt 282 lb (127.9 kg)   SpO2 97%   Breastfeeding No   BMI 44.17 kg/m²       General: No acute distress. Eyes: Sclera anicteric. ENT: No oral lesions. Thyroid normal.  Nodes: No adenopathy. Skin: No spider angiomata. No jaundice. No palmar erythema. Respiratory: Lungs clear to auscultation. Cardiovascular: Regular heart rate. No murmurs. No JVD. Abdomen: Soft non-tender, liver size normal to percussion/palpation. Spleen not palpable. No obvious ascites. Extremities: No edema. No muscle wasting. No gross arthritic changes. Neurologic: Alert and oriented. Cranial nerves grossly intact. No asterixis.       MOST RECENT LABORATORY STUDIES:  Lab Results   Component Value Date/Time    WBC 10.4 02/23/2021 02:19 PM    HGB 13.8 02/23/2021 02:19 PM    HCT 43.5 02/23/2021 02:19 PM    PLATELET 939 52/86/2200 02:19 PM    MCV 92.9 02/23/2021 02:19 PM     Lab Results   Component Value Date/Time    INR 1.0 02/23/2021 02:19 PM    INR 1.0 12/01/2020 12:20 PM    Prothrombin time 13.3 02/23/2021 02:19 PM    Prothrombin time 12.6 12/01/2020 12:20 PM       ASSESSMENT AND PLAN:  EGD to assess for esophageal and/or gastric varices.   Sedation per anesthesiology      Gorge Patel MD  [address], [facility], [address]  [phone]  [address] [place] [address]

## 2021-04-05 NOTE — ANESTHESIA PREPROCEDURE EVALUATION
Relevant Problems   RESPIRATORY SYSTEM   (+) Asthma      CARDIOVASCULAR   (+) Hypertension      GASTROINTESTINAL   (+) Cirrhosis (HCC)   (+) GERD (gastroesophageal reflux disease)   (+) BLANCO (nonalcoholic steatohepatitis)   (+) Primary biliary cholangitis (HCC)       Anesthetic History     PONV          Review of Systems / Medical History  Patient summary reviewed, nursing notes reviewed and pertinent labs reviewed    Pulmonary            Asthma        Neuro/Psych         Psychiatric history     Cardiovascular    Hypertension              Exercise tolerance: >4 METS     GI/Hepatic/Renal     GERD      Liver disease     Endo/Other        Morbid obesity     Other Findings              Physical Exam    Airway  Mallampati: II  TM Distance: 4 - 6 cm  Neck ROM: normal range of motion   Mouth opening: Normal     Cardiovascular  Regular rate and rhythm,  S1 and S2 normal,  no murmur, click, rub, or gallop             Dental  No notable dental hx       Pulmonary  Breath sounds clear to auscultation               Abdominal  GI exam deferred       Other Findings            Anesthetic Plan    ASA: 3  Anesthesia type: MAC            Anesthetic plan and risks discussed with: Patient

## 2021-04-05 NOTE — PROCEDURES
Marylouise Spatz, MD, Maxey Kanner, MD, MPH      Savanah Navarro, SALO Blanc, [facility]     Nicolasa Dudley, [facility]   Aron Edwardgissellliz, [facility]-C    Elsaamalia Marina, [facility]       Felecia Javed [facility] 136    at [address], [address] [place] [address], Sujathalucian  22.    [phone]    FAX: [address] [place] [address], [address]    [phone]    FAX: [phone]         UPPER ENDOSCOPY PROCEDURE NOTE    Jennifer Noemy  1970    INDICATION: Cirrhosis. Screening for esophageal varices with variceal ligation if  indicated. : Lana Larkin MD    SURGICAL ASSISTANT:  None    PROSTHETIC DEVISES, TISSUE GRAFTS, ORGAN TRANSPLANTS:  Not applicable     ANESTHESIA/SEDATION: Sedation per anesthesiology    PROCEDURE DESCRIPTION:  [employer] consent was obtained from the patient for the procedure. All risks and benefits of the procedure explained. The procedure was performed in the endoscopy suite. The patient was laying on a stretcher and moved to the left lateral decubitus position prior to administration of sedation. Sedation was administered by anesthesiology. See their note for details. The endoscope was inserted into the mouth and advanced under direct vision to the second portion of the duodenum. Careful inspection of upper gastrointestinal tract was made as the endoscope was inserted and withdrawn. Retroflexion of the endoscope to view of the cardia of the stomach was performed. The findings and interventions are described below.       FINDINGS:  Esophagus:    Normal.      Stomach:   No portal hypertensive gastropathy   Mild gastritis of the antrum    Duodenum:   Normal bulb and second portion    SPECIMENS COLLECTED:   2 biopsies were taken from the antrum. The specimens were placed in preservative and transported to Pathology after the procedure. INTERVENTIONS:  None    COMPLICATIONS: None. The patient tolerated the procedure well. EBL: Negligible. RECOMMENDATIONS:  Observe until discharge parameters are achieved. May resume previous diet. Repeat endoscopy to reassess varices and need for banding in 3 years. Follow-up [facility] office as scheduled.       Oscar Zamora MD  [address]  4 [facility], [address] [place] [address] Theresa Thapa, [address]  12 [facility]

## 2021-05-27 ENCOUNTER — OFFICE VISIT (OUTPATIENT)
Dept: HEMATOLOGY | Age: 51
End: 2021-05-27
Payer: COMMERCIAL

## 2021-05-27 ENCOUNTER — HOSPITAL ENCOUNTER (OUTPATIENT)
Dept: LAB | Age: 51
Discharge: HOME | End: 2021-05-27
Payer: COMMERCIAL

## 2021-05-27 VITALS
TEMPERATURE: 97.1 F | OXYGEN SATURATION: 97 % | DIASTOLIC BLOOD PRESSURE: 87 MMHG | HEART RATE: 86 BPM | RESPIRATION RATE: 17 BRPM | WEIGHT: 282 LBS | SYSTOLIC BLOOD PRESSURE: 138 MMHG | BODY MASS INDEX: 44.26 KG/M2 | HEIGHT: 67 IN

## 2021-05-27 DIAGNOSIS — K74.3 HEPATIC CIRRHOSIS DUE TO PRIMARY BILIARY CHOLANGITIS (HCC): ICD-10-CM

## 2021-05-27 DIAGNOSIS — K74.3 HEPATIC CIRRHOSIS DUE TO PRIMARY BILIARY CHOLANGITIS (HCC): Primary | ICD-10-CM

## 2021-05-27 LAB
ALBUMIN SERPL-MCNC: 3.9 G/DL (ref 3.4–5)
ALBUMIN/GLOB SERPL: 1 {RATIO} (ref 0.8–1.7)
ALP SERPL-CCNC: 195 U/L (ref 45–117)
ALT SERPL-CCNC: 46 U/L (ref 13–56)
ANION GAP SERPL CALC-SCNC: 6 MMOL/L (ref 3–18)
AST SERPL-CCNC: 42 U/L (ref 10–38)
BASOPHILS # BLD: 0.1 K/UL (ref 0–0.1)
BASOPHILS NFR BLD: 1 % (ref 0–2)
BILIRUB DIRECT SERPL-MCNC: 0.1 MG/DL (ref 0–0.2)
BILIRUB SERPL-MCNC: 0.3 MG/DL (ref 0.2–1)
BUN SERPL-MCNC: 12 MG/DL (ref 7–18)
BUN/CREAT SERPL: 21 (ref 12–20)
CALCIUM SERPL-MCNC: 9.1 MG/DL (ref 8.5–10.1)
CHLORIDE SERPL-SCNC: 106 MMOL/L (ref 100–111)
CO2 SERPL-SCNC: 28 MMOL/L (ref 21–32)
CREAT SERPL-MCNC: 0.58 MG/DL (ref 0.6–1.3)
DIFFERENTIAL METHOD BLD: ABNORMAL
EOSINOPHIL # BLD: 0.3 K/UL (ref 0–0.4)
EOSINOPHIL NFR BLD: 3 % (ref 0–5)
ERYTHROCYTE [DISTWIDTH] IN BLOOD BY AUTOMATED COUNT: 13.7 % (ref 11.6–14.5)
GLOBULIN SER CALC-MCNC: 3.8 G/DL (ref 2–4)
GLUCOSE SERPL-MCNC: 98 MG/DL (ref 74–99)
HCT VFR BLD AUTO: 42.5 % (ref 35–45)
HGB BLD-MCNC: 13.3 G/DL (ref 12–16)
INR PPP: 0.9 (ref 0.8–1.2)
LYMPHOCYTES # BLD: 3.7 K/UL (ref 0.9–3.6)
LYMPHOCYTES NFR BLD: 37 % (ref 21–52)
MCH RBC QN AUTO: 29.2 PG (ref 24–34)
MCHC RBC AUTO-ENTMCNC: 31.3 G/DL (ref 31–37)
MCV RBC AUTO: 93.2 FL (ref 74–97)
MONOCYTES # BLD: 0.8 K/UL (ref 0.05–1.2)
MONOCYTES NFR BLD: 8 % (ref 3–10)
NEUTS SEG # BLD: 5.1 K/UL (ref 1.8–8)
NEUTS SEG NFR BLD: 51 % (ref 40–73)
PLATELET # BLD AUTO: 376 K/UL (ref 135–420)
PMV BLD AUTO: 10.1 FL (ref 9.2–11.8)
POTASSIUM SERPL-SCNC: 4.6 MMOL/L (ref 3.5–5.5)
PROT SERPL-MCNC: 7.7 G/DL (ref 6.4–8.2)
PROTHROMBIN TIME: 12.5 SEC (ref 11.5–15.2)
RBC # BLD AUTO: 4.56 M/UL (ref 4.2–5.3)
SODIUM SERPL-SCNC: 140 MMOL/L (ref 136–145)
WBC # BLD AUTO: 9.9 K/UL (ref 4.6–13.2)

## 2021-05-27 PROCEDURE — 80048 BASIC METABOLIC PNL TOTAL CA: CPT

## 2021-05-27 PROCEDURE — 99214 OFFICE O/P EST MOD 30 MIN: CPT | Performed by: NURSE PRACTITIONER

## 2021-05-27 PROCEDURE — 80076 HEPATIC FUNCTION PANEL: CPT

## 2021-05-27 PROCEDURE — 82107 ALPHA-FETOPROTEIN L3: CPT

## 2021-05-27 PROCEDURE — 36415 COLL VENOUS BLD VENIPUNCTURE: CPT

## 2021-05-27 PROCEDURE — 85610 PROTHROMBIN TIME: CPT

## 2021-05-27 PROCEDURE — 85025 COMPLETE CBC W/AUTO DIFF WBC: CPT

## 2021-05-27 RX ORDER — PSEUDOEPHEDRINE HCL 30 MG
30 TABLET ORAL
COMMUNITY

## 2021-05-27 NOTE — PROGRESS NOTES
Marco Antonio Laboy MD, [address], [place], Inocencia Elias MD, MPH      [facility], PA-C    Roger Rojas, ACNP-BC     Nicolasa Dudley, [facility]   Milagro Bustos, FNP-TITI Lemus, [facility]       Felecia Javed Hany [address]    at [address], [facility] 22.    [phone]    FAX: [phone], [address]    [phone]    FAX: [phone]       Patient Care Team:  Brooke Rico as PCP - General (Physician Assistant)      Problem List  Date Reviewed: 4/5/2021        Codes Class Noted    BLANCO (nonalcoholic steatohepatitis) ICD-10-CM: K75.81  ICD-9-CM: 571.8  2/7/2021        Cirrhosis ([address] ICD-10-CM: K74.60  ICD-9-CM: 571.5  2/7/2021        Primary biliary cholangitis ([address] ICD-10-CM: K74.3  ICD-9-CM: 571.6  11/9/2020        History of cholecystectomy ICD-10-CM: Z90.49  ICD-9-CM: V45.79  11/9/2020        Hypertension ICD-10-CM: I10  ICD-9-CM: 401.9  11/9/2020        Ventral hernia ICD-10-CM: K43.9  ICD-9-CM: 553.20  11/9/2020        Asthma ICD-10-CM: J45.909  ICD-9-CM: 493.90  11/9/2020        GERD (gastroesophageal reflux disease) ICD-10-CM: K21.9  ICD-9-CM: 530.81  11/9/2020        Anxiety ICD-10-CM: F41.9  ICD-9-CM: 300.00  11/9/2020                Ke Cooper is being seen at [place] [address] for management of Primary Biliary Cholangitis and BLANCO. The active problem list, all pertinent past medical history, medications, radiologic findings and laboratory findings related to the liver disorder were reviewed with the patient. The patient is a 44 y.o.   female who was found to have elevated  liver enzymes in 2015      Serologic evaluation for markers of chronic liver disease was positive for AMA      MRI of the liver was performed in 9/2020. The results of the imaging suggested fatty liver disease. The patient underwent a liver biopsy in 1/2021. This demonstrates bile duct changes consistent with PBC and florid bile duct lesions. There is also BLANCO with 66-75% steatosis, severe inflammation, severe ballooning, Miguelina bodies and cirrhosis. The patient has the following symptoms which are thought to be due to the liver disease:  fatigue, pain in the right side over the liver, dry mouth, arthralgias, myalgias, itching, swelling of the abdomen,     The patient is not currently experiencing the following symptoms of liver disease:  dry eyes, dry mouth, arthralgias, myalgias, swelling of the lower extremities, hematemesis, hematochezia. The patient has Moderate limitations in functional activities which can be attributed to the liver disease. ASSESSMENT AND PLAN:  Primary Biliary Cholangitis  The diagnosis is based upon liver biopsy, serology, and an elevation in ALP. A liver biopsy was performed in 1/2021. This demonstrated bile duct lesions consistent with PBC, co-existent BLANCO and cirrhosis. Assessment of liver fibrosis was performed with Fibroscan in 12/2020. The result was 20.7 kPa which correlates with cirrhosis. The CAP score of 337 suggested this may be due to fatty liver. Liver transaminases are elevated. ALP is elevated. Liver function is normal.  The platelet count is normal.    The patient was started on ISABELLE in 01/2021 at a dose of 1000 mg BID. She cannot tolerate dose of 1,000 mg bid due to GI distress. She reports that she is taking 500 mg/bid. The side effects of ISABELLE including a 2% risk of itching and a 2% risk of diarrhea were discussed. The need to perform an assessment of liver fibrosis was discussed with the patient.   The Fibroscan can assess liver fibrosis and determine if a patient has advanced fibrosis or cirrhosis without the need for liver biopsy. This will be performed at the next office visit. The Fibroscan can be repeated annually or as often as clinically indicated to assess for fibrosis progression and/or regression. BLANCO  The diagnosis is based upon liver biopsy, Fiboscan CAP score,     A liver biopsy performed in 1/2021 shows severe BLANCO with ARABELLA score (332) and cirrhosis. There is also changes consistent with PBC. Elastography performed in 12/2020 suggests Cirrhosis and fatty liver. Liver transaminases are elevated. ALP is elevated. Liver function is normal.  The platelet count is normal.    If the patient looses 20% of current body weight, which is 56 pounds, down to a weight of of 230 pounds, all steatosis will have resolved. Once all steatosis has resolved all inflammation will resolve. Then all fibrosis will gradually resolve and the liver could eventually be normal.    She has two separate liver diseases and liver biopsy suggests that the BLANCO is severe. It is imperative that she loose weight to at least remove one factor causing liver disease progression. Cirrhosis  Complications of cirrhosis were discussed in detail. We discussed thrombocytopenia, portal hypertension, varices, GI bleeding, peripheral edema, ascites, hepatic encephalopathy, and hepatocellular carcinoma. We discussed the need for follow ups on a regular basis, at 3 month intervals to monitor for complications. We discussed the need for every 6 month liver imaging studies. Screening for Esophageal varices   The patient had an EGD in 04/2021 by Dr. Vandana Hannah. Her esophagus is normal. Pathology is negative. Repeat EGD in 3 years. Counseling for diet and weight loss in patients with confirmed or suspected NAFLD  The patient was counseled regarding diet and exercise to achieve weight loss.   The best diet for patients with fatty liver is one very low in carbohydrates and enriched with protein such as an Julienne's program.      The patient was told not to consume any food products and drinks containing fructose as this enhances hepatic fat synthesis. There is no medication or vitamin supplements that we advocate for BLANCO. Using glitazones in patients without diabetes mellitus has been shown to reduce fat content in the liver but has no effect on fibrosis and is associated with weight gain. Vitamin E has also been used but the data is not very good and most experts no longer advocate this. Screening for Hepatocellular Carcinoma  HCC screening has recently been performed and does not suggest [facility] The next liver imaging study will be performed in 10/2021. Ultrasound and AFP-L3% were ordered. Treatment of other medical problems in patients with chronic liver disease  There are no contraindications for the patient to take most medications that are necessary for treatment of other medical issues. The patient has cirrhrosis and should avoid taking NSAIDs which are associated with a higher rate of developing MARCUS. The patient can take any medications utilized for treatment of DM, Statins to treat hypercholesterolemia. The patient does not comsume alcohol on a daily basis. Normal doses of acetaminophen, as recommended on the label of the bottle, are not hepatotoxic except in the setting of daily alcohol use, even in patients with cirrhosis and can be utilized for pain. Counseling for alcohol in patients with chronic liver disease  The patient was counseled regarding alcohol consumption and the effect of alcohol on chronic liver disease. The patient does not consume any significant amount of alcohol. Vaccinations   Vaccination for viral hepatitis A and B is recommended since the patient has no serologic evidence of previous exposure or vaccination with immunity. Routine vaccinations against other bacterial and viral agents can be performed as indicated.   Annual flu vaccination should be administered if indicated. ALLERGIES  Allergies   Allergen Reactions    Lisinopril Anaphylaxis    Ciprofibrate Nausea and Vomiting       MEDICATIONS  Current Outpatient Medications   Medication Sig    predniSONE (STERAPRED DS) 10 mg dose pack Take 10 mg by mouth See Admin Instructions. See administration instruction per 10mg dose pack    ursodioL (ISABELLE Forte) 500 mg tablet Take 2 Tabs by mouth two (2) times a day.  ibuprofen (Motrin IB) 200 mg tablet Take 400 mg by mouth every eight (8) hours as needed for Pain.  fexofenadine-pseudoephedrine (Allegra-D 24 Hour) 180-240 mg per tablet Take 1 Tab by mouth daily.  amLODIPine (NORVASC) 5 mg tablet Take 5 mg by mouth daily.  esomeprazole (NEXIUM) 40 mg capsule TAKE 1 CAPSULE BY MOUTH EVERY DAY    Flovent  mcg/actuation inhaler Take  by inhalation daily.  losartan (COZAAR) 100 mg tablet 100 mg daily.  PARoxetine (PAXIL) 20 mg tablet Take 40 mg by mouth daily. No current facility-administered medications for this visit. SYSTEM REVIEW NOT RELATED TO LIVER DISEASE OR REVIEWED ABOVE:  Constitution systems: Negative for fever, chills, weight gain, weight loss. Eyes: Negative for visual changes. ENT: Negative for sore throat, painful swallowing. Respiratory: Negative for cough, hemoptysis, SOB. Cardiology: Negative for chest pain, palpitations. GI:  Negative for constipation or diarrhea. : Negative for urinary frequency, dysuria, hematuria, nocturia. Skin: Negative for rash. Hematology: Negative for easy bruising, blood clots. Musculo-skelatal: Negative for back pain, muscle pain, weakness. Neurologic: Negative for headaches, dizziness, vertigo, memory problems not related to HE. Psychology: Negative for anxiety, depression. FAMILY HISTORY:  The father  of cirrhosis. The mother  of dementia. There is no family history of liver disease. SOCIAL HISTORY:  The patient is .     The patient has 2 children, 1 adopted child,   The patient has never used tobacco products. The patient has never consumed significant amounts of alcohol. The patient currently works part time as . General: No acute distress. Eyes: Sclera anicteric. ENT: No oral lesions. Thyroid normal.  Nodes: No adenopathy. Skin: No spider angiomata. No jaundice. No palmar erythema. Respiratory: Lungs clear to auscultation. Cardiovascular: Regular heart rate. No murmurs. No JVD. Abdomen: Soft non-tender. Liver size normal to percussion/palpation. Spleen not palpable. No obvious ascites. Extremities: No edema. No muscle wasting. No gross arthritic changes. Neurologic: Alert and oriented. Cranial nerves grossly intact. No asterixis. LABORATORY STUDIES:  [facility] [place] of [address] Units 5/27/2021 2/23/2021   WBC 4.6 - 13.2 K/uL 9.9 10.4   ANC 1.8 - 8.0 K/UL 5.1 5.5   HGB 12.0 - 16.0 g/dL 13.3 13.8    - 420 K/uL 376 402   INR 0.8 - 1.2   0.9 1.0   AST 10 - 38 U/L 42 (H) 34   ALT 13 - 56 U/L 46 49   Alk Phos 45 - 117 U/L 195 (H) 194 (H)   Bili, Total 0.2 - 1.0 MG/DL 0.3 0.4   Bili, Direct 0.0 - 0.2 MG/DL 0.1 0.2   Albumin 3.4 - 5.0 g/dL 3.9 3.6   BUN 7.0 - 18 MG/DL 12 14   Creat 0.6 - 1.3 MG/DL 0.58 (L) 0.86   Na 136 - 145 mmol/L 140 142   K 3.5 - 5.5 mmol/L 4.6 4.1   Cl 100 - 111 mmol/L 106 106   CO2 21 - 32 mmol/L 28 28   Glucose 74 - 99 mg/dL 98 165 (H)     Cancer Screening Latest Ref Rng & Units 2/23/2021   AFP, Serum 0.0 - 8.0 ng/mL 6.3   AFP-L3% 0.0 - 9.9 % Comment     SEROLOGIES:  4/2015.   HAV total negative, positive, HBsAntigen negative, anti-HCV negative, Ferritin 15, iron saturation 12%, ASMA negative, AMA strongly positive 110, ceruloplsmin 28, alpha-1-antitrypsin 159, TSH 2.46, T4 free 1.2    Serologies Latest Ref Rng & Units 12/1/2020   Hep A Ab, Total Negative   Negative   Hep B Core Ab, Total Negative   Negative   Hep B Surface Ab >10.0 mIU/mL <3.10 (L)   Hep B Surface Ab Interp POS   Negative (A)   Ferritin 8 - 388 NG/   Iron % Saturation 20 - 50 % 23   VALERIA, IFA  Negative   M2 Ab 0.0 - 20.0 Units 181.3 (H)     LIVER HISTOLOGY:  12/2020. FibroScan performed at [address]. EkPa was 20.7. IQR/med 22%. . The results suggested a fibrosis level of F4. The CAP score suggests fatty liver  1/2021. Slides reviewed by MLS. BLANCO. 66-75% macrovesicualr and micovesicular steatosis, Severe inflammation, Severe ballooning, Stage 4 fibrosis. ARABELLA (332). Bile duct changes consistent with PBC including florid bile duct lesions. Cirrhosis. ENDOSCOPIC PROCEDURES:  04/2021. EGD by Dr. Bib Jeffries. Normal esophagus. Pathology is negative. Repeat in 3 years. RADIOLOGY:  5/2018. Ultrasound of liver. Echogenic consistent with fatty liver. No liver mass lesions. No dilated bile ducts. No ascites. 9/2020. CT scan abdomen with IV contrast.  Changes consistent with fatty liver. No liver mass lesions. Normal appearing liver with areas of focal fat. Normal spleen. No ascites. 9/2020. Dynamic MRI of th liver. Changes consistent with fatty liver. No liver mass lesions. Normal spleen. No ascites. 04/2021. Ultrasound of the liver. Slightly coarsened echotexture. Portal vein normal caliber with antegrade flow. No focal abnormality. OTHER TESTING:  Not available or performed    FOLLOW-UP:  All of the issues listed above in the Assessment and Plan were discussed with the patient. All questions were answered. The patient expressed a clear understanding of the above. [address] in 3 months.      ANA Hernandez  [facility] [place] [facility]  [address], [address]   [address], [address] [facility]   [phone]

## 2021-06-02 LAB
AFP L3 MFR SERPL: NORMAL % (ref 0–9.9)
AFP SERPL-MCNC: 4.6 NG/ML (ref 0–8)

## 2021-10-25 ENCOUNTER — HOSPITAL ENCOUNTER (OUTPATIENT)
Dept: ULTRASOUND IMAGING | Age: 51
Discharge: HOME | End: 2021-10-25
Payer: COMMERCIAL

## 2021-10-25 DIAGNOSIS — K74.3 HEPATIC CIRRHOSIS DUE TO PRIMARY BILIARY CHOLANGITIS (HCC): ICD-10-CM

## 2021-10-25 PROCEDURE — 76705 ECHO EXAM OF ABDOMEN: CPT

## 2021-11-30 ENCOUNTER — OFFICE VISIT (OUTPATIENT)
Dept: HEMATOLOGY | Age: 51
End: 2021-11-30
Payer: COMMERCIAL

## 2021-11-30 ENCOUNTER — HOSPITAL ENCOUNTER (OUTPATIENT)
Dept: LAB | Age: 51
Discharge: HOME | End: 2021-11-30
Payer: COMMERCIAL

## 2021-11-30 VITALS
DIASTOLIC BLOOD PRESSURE: 79 MMHG | HEART RATE: 79 BPM | HEIGHT: 67 IN | RESPIRATION RATE: 20 BRPM | SYSTOLIC BLOOD PRESSURE: 133 MMHG | TEMPERATURE: 98.4 F | WEIGHT: 267 LBS | BODY MASS INDEX: 41.91 KG/M2 | OXYGEN SATURATION: 98 %

## 2021-11-30 DIAGNOSIS — K74.3 PRIMARY BILIARY CHOLANGITIS (HCC): Primary | ICD-10-CM

## 2021-11-30 DIAGNOSIS — K74.3 PRIMARY BILIARY CHOLANGITIS (HCC): ICD-10-CM

## 2021-11-30 LAB
ALBUMIN SERPL-MCNC: 3.5 G/DL (ref 3.4–5)
ALBUMIN/GLOB SERPL: 0.9 {RATIO} (ref 0.8–1.7)
ALP SERPL-CCNC: 174 U/L (ref 45–117)
ALT SERPL-CCNC: 28 U/L (ref 13–56)
ANION GAP SERPL CALC-SCNC: 8 MMOL/L (ref 3–18)
AST SERPL-CCNC: 25 U/L (ref 10–38)
BASOPHILS # BLD: 0.1 K/UL (ref 0–0.1)
BASOPHILS NFR BLD: 1 % (ref 0–2)
BILIRUB DIRECT SERPL-MCNC: 0.1 MG/DL (ref 0–0.2)
BILIRUB SERPL-MCNC: 0.3 MG/DL (ref 0.2–1)
BUN SERPL-MCNC: 12 MG/DL (ref 7–18)
BUN/CREAT SERPL: 21 (ref 12–20)
CALCIUM SERPL-MCNC: 9.1 MG/DL (ref 8.5–10.1)
CHLORIDE SERPL-SCNC: 104 MMOL/L (ref 100–111)
CO2 SERPL-SCNC: 27 MMOL/L (ref 21–32)
CREAT SERPL-MCNC: 0.56 MG/DL (ref 0.6–1.3)
DIFFERENTIAL METHOD BLD: NORMAL
EOSINOPHIL # BLD: 0.2 K/UL (ref 0–0.4)
EOSINOPHIL NFR BLD: 3 % (ref 0–5)
ERYTHROCYTE [DISTWIDTH] IN BLOOD BY AUTOMATED COUNT: 13.5 % (ref 11.6–14.5)
GLOBULIN SER CALC-MCNC: 3.9 G/DL (ref 2–4)
GLUCOSE SERPL-MCNC: 107 MG/DL (ref 74–99)
HCT VFR BLD AUTO: 42.2 % (ref 35–45)
HGB BLD-MCNC: 13.2 G/DL (ref 12–16)
IMM GRANULOCYTES # BLD AUTO: 0 K/UL (ref 0–0.04)
IMM GRANULOCYTES NFR BLD AUTO: 0 % (ref 0–0.5)
INR PPP: 1 (ref 0.8–1.2)
LYMPHOCYTES # BLD: 2.8 K/UL (ref 0.9–3.6)
LYMPHOCYTES NFR BLD: 35 % (ref 21–52)
MCH RBC QN AUTO: 28.4 PG (ref 24–34)
MCHC RBC AUTO-ENTMCNC: 31.3 G/DL (ref 31–37)
MCV RBC AUTO: 90.8 FL (ref 78–100)
MONOCYTES # BLD: 0.6 K/UL (ref 0.05–1.2)
MONOCYTES NFR BLD: 8 % (ref 3–10)
NEUTS SEG # BLD: 4.3 K/UL (ref 1.8–8)
NEUTS SEG NFR BLD: 53 % (ref 40–73)
NRBC # BLD: 0 K/UL (ref 0–0.01)
NRBC BLD-RTO: 0 PER 100 WBC
PLATELET # BLD AUTO: 391 K/UL (ref 135–420)
PMV BLD AUTO: 10.1 FL (ref 9.2–11.8)
POTASSIUM SERPL-SCNC: 4.5 MMOL/L (ref 3.5–5.5)
PROT SERPL-MCNC: 7.4 G/DL (ref 6.4–8.2)
PROTHROMBIN TIME: 12.5 SEC (ref 11.5–15.2)
RBC # BLD AUTO: 4.65 M/UL (ref 4.2–5.3)
SODIUM SERPL-SCNC: 139 MMOL/L (ref 136–145)
WBC # BLD AUTO: 8.1 K/UL (ref 4.6–13.2)

## 2021-11-30 PROCEDURE — 85025 COMPLETE CBC W/AUTO DIFF WBC: CPT

## 2021-11-30 PROCEDURE — 82107 ALPHA-FETOPROTEIN L3: CPT

## 2021-11-30 PROCEDURE — 99214 OFFICE O/P EST MOD 30 MIN: CPT | Performed by: NURSE PRACTITIONER

## 2021-11-30 PROCEDURE — 91200 LIVER ELASTOGRAPHY: CPT | Performed by: NURSE PRACTITIONER

## 2021-11-30 PROCEDURE — 80048 BASIC METABOLIC PNL TOTAL CA: CPT

## 2021-11-30 PROCEDURE — 36415 COLL VENOUS BLD VENIPUNCTURE: CPT

## 2021-11-30 PROCEDURE — 85610 PROTHROMBIN TIME: CPT

## 2021-11-30 PROCEDURE — 80076 HEPATIC FUNCTION PANEL: CPT

## 2021-11-30 NOTE — PROGRESS NOTES
[address] [place] [address], MD, Rose Mary Casarez MD, MPH      Christiana Vu, SALO Tabor, ACNP-BC     April S Luis Enrique, [facility]   Armaan Manuel, FNP-TITI Diaz, [facility]       Felecia Javed [facility] 136    at [address], [address] [place] [address], [facility] 22.    [phone]    FAX: [phone] [facility]    at [address], [address]    [phone]    FAX: [phone]       Patient Care Team:  Leeann Perez as PCP - General (Physician Assistant)      Problem List  Date Reviewed: 11/30/2021          Codes Class Noted    BLANCO (nonalcoholic steatohepatitis) ICD-10-CM: K75.81  ICD-9-CM: 571.8  2/7/2021        Cirrhosis ([facility] [address] ICD-10-CM: K74.60  ICD-9-CM: 571.5  2/7/2021        Primary biliary cholangitis ([facility] [address] ICD-10-CM: K74.3  ICD-9-CM: 571.6  11/9/2020        History of cholecystectomy ICD-10-CM: Z90.49  ICD-9-CM: V45.79  11/9/2020        Hypertension ICD-10-CM: I10  ICD-9-CM: 401.9  11/9/2020        Ventral hernia ICD-10-CM: K43.9  ICD-9-CM: 553.20  11/9/2020        Asthma ICD-10-CM: J45.909  ICD-9-CM: 493.90  11/9/2020        GERD (gastroesophageal reflux disease) ICD-10-CM: K21.9  ICD-9-CM: 530.81  11/9/2020        Anxiety ICD-10-CM: F41.9  ICD-9-CM: 300.00  11/9/2020                Emelina Villafuerte is being seen at [place] [id number] for fibroscan assessment of hepatic fibrosis and management of Primary Biliary Cholangitis and BLANCO. The active problem list, all pertinent past medical history, medications, radiologic findings and laboratory findings related to the liver disorder were reviewed with the patient. The patient is a 46 y.o.   female who was found to have elevated  liver enzymes in 2015 Serologic evaluation for markers of chronic liver disease was positive for AMA      MRI of the liver was performed in 9/2020. The results of the imaging suggested fatty liver disease. The patient underwent a liver biopsy in 1/2021. This demonstrates bile duct changes consistent with PBC and florid bile duct lesions. There is also BLANCO with 66-75% steatosis, severe inflammation, severe ballooning, Miguelina bodies and cirrhosis. Today's fibroscan analysis confirms cirrhosis and fatty liver disease. The patient has the following symptoms which are thought to be due to the liver disease:  fatigue, pain in the right side over the liver, dry mouth, arthralgias, myalgias. The patient is not currently experiencing the following symptoms of liver disease:  dry eyes, dry mouth, arthralgias, myalgias, swelling of the lower extremities, hematemesis, hematochezia. The patient has Moderate limitations in functional activities which can be attributed to the liver disease. Since the last office appointment:  Has 15 pounds. ASSESSMENT AND PLAN:  Primary Biliary Cholangitis  The diagnosis is based upon liver biopsy, serology, and an elevation in ALP. A liver biopsy was performed in 1/2021. This demonstrated bile duct lesions consistent with PBC, co-existent BLANCO and cirrhosis. Assessment of liver fibrosis was performed with Fibroscan in 12/2020. The result was 20.7 kPa which correlates with cirrhosis. The CAP score of 337 suggested this may be due to fatty liver. Today's fibroscan analysis indicates both cirrhosis and fatty liver. The fibroscan will be repeated annually or as often as is clinically indicated to document fibrosis regression or progression.       The most recent laboratory studies indicate that the liver transaminases are normal, alkaline phosphatase is elevated, tests of hepatic synthetic and metabolic function are normal, and the platelet count is normal.        The patient was started on ISABELLE in 01/2021 at a dose of 1000 mg BID. The side effects of ISABELLE including a 2% risk of itching and a 2% risk of diarrhea were discussed. BLANCO  The diagnosis is based upon liver biopsy, Fiboscan CAP score,     A liver biopsy performed in 1/2021 shows severe BLANCO with ARABELLA score (332) and cirrhosis. There is also changes consistent with PBC. Elastography performed in 12/2020 suggests Cirrhosis and fatty liver. Liver transaminases are elevated. ALP is elevated. Liver function is normal.  The platelet count is normal.    If the patient looses 20% of current body weight, which is 56 pounds, down to a weight of of 230 pounds, all steatosis will have resolved. Once all steatosis has resolved all inflammation will resolve. Then all fibrosis will gradually resolve and the liver could eventually be normal.    She has two separate liver diseases and liver biopsy suggests that the BLANCO is severe. It is imperative that she loose weight to at least remove one factor causing liver disease progression. Cirrhosis  Complications of cirrhosis were discussed in detail. We discussed thrombocytopenia, portal hypertension, varices, GI bleeding, peripheral edema, ascites, hepatic encephalopathy, and hepatocellular carcinoma. We discussed the need for follow ups on a regular basis, at 3 month intervals to monitor for complications. We discussed the need for every 6 month liver imaging studies. Screening for Esophageal varices   The patient had an EGD in 04/2021 by Dr. Stefano Sheffield. Her esophagus is normal. Pathology is negative. Repeat EGD in 3 years. Counseling for diet and weight loss in patients with confirmed or suspected NAFLD  The patient was counseled regarding diet and exercise to achieve weight loss.   The best diet for patients with fatty liver is one very low in carbohydrates and enriched with protein such as an Julienne's program.      The patient was told not to consume any food products and drinks containing fructose as this enhances hepatic fat synthesis. There is no medication or vitamin supplements that we advocate for BLANCO. Using glitazones in patients without diabetes mellitus has been shown to reduce fat content in the liver but has no effect on fibrosis and is associated with weight gain. Vitamin E has also been used but the data is not very good and most experts no longer advocate this. Screening for Hepatocellular Carcinoma  HCC screening has recently been performed and does not suggest [facility] [address] The next liver imaging study will be performed in 10/2021. Ultrasound and AFP-L3% were ordered. Treatment of other medical problems in patients with chronic liver disease  There are no contraindications for the patient to take most medications that are necessary for treatment of other medical issues. The patient has cirrhrosis and should avoid taking NSAIDs which are associated with a higher rate of developing MARCUS. The patient can take any medications utilized for treatment of DM, Statins to treat hypercholesterolemia. The patient does not comsume alcohol on a daily basis. Normal doses of acetaminophen, as recommended on the label of the bottle, are not hepatotoxic except in the setting of daily alcohol use, even in patients with cirrhosis and can be utilized for pain. Counseling for alcohol in patients with chronic liver disease  The patient was counseled regarding alcohol consumption and the effect of alcohol on chronic liver disease. The patient does not consume any significant amount of alcohol. Vaccinations   Vaccination for viral hepatitis A and B is recommended since the patient has no serologic evidence of previous exposure or vaccination with immunity. Routine vaccinations against other bacterial and viral agents can be performed as indicated. Annual flu vaccination should be administered if indicated.       ALLERGIES  Allergies   Allergen Reactions    Lisinopril Anaphylaxis    Ciprofibrate Nausea and Vomiting       MEDICATIONS  Current Outpatient Medications   Medication Sig    ursodioL (ACTIGALL) 500 mg tablet Take 2 Tablets by mouth two (2) times a day.  pseudoephedrine (SUDAFED) 30 mg tablet Take 30 mg by mouth every six (6) hours as needed.  ibuprofen (Motrin IB) 200 mg tablet Take 400 mg by mouth every eight (8) hours as needed for Pain.  fexofenadine-pseudoephedrine (Allegra-D 24 Hour) 180-240 mg per tablet Take 1 Tab by mouth daily.  amLODIPine (NORVASC) 5 mg tablet Take 5 mg by mouth daily.  esomeprazole (NEXIUM) 40 mg capsule TAKE 1 CAPSULE BY MOUTH EVERY DAY    Flovent  mcg/actuation inhaler Take  by inhalation daily.  losartan (COZAAR) 100 mg tablet 100 mg daily.  PARoxetine (PAXIL) 20 mg tablet Take 40 mg by mouth daily. No current facility-administered medications for this visit. SYSTEM REVIEW NOT RELATED TO LIVER DISEASE OR REVIEWED ABOVE:  Constitution systems: Negative for fever, chills, weight gain, weight loss. Eyes: Negative for visual changes. ENT: Negative for sore throat, painful swallowing. Respiratory: Negative for cough, hemoptysis, SOB. Cardiology: Negative for chest pain, palpitations. GI:  Negative for constipation or diarrhea. : Negative for urinary frequency, dysuria, hematuria, nocturia. Skin: Negative for rash. Hematology: Negative for easy bruising, blood clots. Musculo-skelatal: Negative for back pain, muscle pain, weakness. Neurologic: Negative for headaches, dizziness, vertigo, memory problems not related to HE. Psychology: Negative for anxiety, depression. FAMILY HISTORY:  The father  of cirrhosis. The mother  of dementia. There is no family history of liver disease. SOCIAL HISTORY:  The patient is . The patient has 2 children, 1 adopted child,   The patient has never used tobacco products.     The patient has never consumed significant amounts of alcohol. The patient currently works part time as . General: No acute distress. Eyes: Sclera anicteric. ENT: No oral lesions. Thyroid normal.  Nodes: No adenopathy. Skin: No spider angiomata. No jaundice. No palmar erythema. Respiratory: Lungs clear to auscultation. Cardiovascular: Regular heart rate. No murmurs. No JVD. Abdomen: Soft non-tender. Liver size normal to percussion/palpation. Spleen not palpable. No obvious ascites. Extremities: No edema. No muscle wasting. No gross arthritic changes. Neurologic: Alert and oriented. Cranial nerves grossly intact. No asterixis. LABORATORY STUDIES:  [facility] [place] [address] Units 11/30/2021 5/27/2021   WBC 4.6 - 13.2 K/uL 8.1 9.9   ANC 1.8 - 8.0 K/UL 4.3 5.1   HGB 12.0 - 16.0 g/dL 13.2 13.3    - 420 K/uL 391 376   INR 0.8 - 1.2   1.0 0.9   AST 10 - 38 U/L 25 42 (H)   ALT 13 - 56 U/L 28 46   Alk Phos 45 - 117 U/L 174 (H) 195 (H)   Bili, Total 0.2 - 1.0 MG/DL 0.3 0.3   Bili, Direct 0.0 - 0.2 MG/DL 0.1 0.1   Albumin 3.4 - 5.0 g/dL 3.5 3.9   BUN 7.0 - 18 MG/DL 12 12   Creat 0.6 - 1.3 MG/DL 0.56 (L) 0.58 (L)   Na 136 - 145 mmol/L 139 140   K 3.5 - 5.5 mmol/L 4.5 4.6   Cl 100 - 111 mmol/L 104 106   CO2 21 - 32 mmol/L 27 28   Glucose 74 - 99 mg/dL 107 (H) 98     Cancer Screening Latest Ref Rng & Units 5/27/2021 2/23/2021   AFP, Serum 0.0 - 8.0 ng/mL 4.6 6.3   AFP-L3% 0.0 - 9.9 % Comment Comment     SEROLOGIES:  4/2015.   HAV total negative, positive, HBsAntigen negative, anti-HCV negative, Ferritin 15, iron saturation 12%, ASMA negative, AMA strongly positive 110, ceruloplsmin 28, alpha-1-antitrypsin 159, TSH 2.46, T4 free 1.2    Serologies Latest Ref Rng & Units 12/1/2020   Hep A Ab, Total Negative   Negative   Hep B Core Ab, Total Negative   Negative   Hep B Surface Ab >10.0 mIU/mL <3.10 (L)   Hep B Surface Ab Interp POS   Negative (A)   Ferritin 8 - 388 NG/   Iron % Saturation 20 - 50 % 23   VALERIA, IFA  Negative   M2 Ab 0.0 - 20.0 Units 181.3 (H)     LIVER HISTOLOGY:  12/2020. FibroScan performed at [facility]. EkPa was 20.7. IQR/med 22%. . The results suggested a fibrosis level of F4. The CAP score suggests fatty liver. 1/2021. Slides reviewed by MLS. BLANCO. 66-75% macrovesicualr and micovesicular steatosis, Severe inflammation, Severe ballooning, Stage 4 fibrosis. ARABELLA (332). Bile duct changes consistent with PBC including florid bile duct lesions. Cirrhosis. 11/2021. FibroScan performed at [facility]. EkPa was 25.4. IQR/med 29%. . The results suggested a fibrosis level of F4. The CAP score suggests there is hepatic steatosis. ENDOSCOPIC PROCEDURES:  04/2021. EGD by Dr. Coretta Bermudez. Normal esophagus. Pathology is negative. Repeat in 3 years. RADIOLOGY:  5/2018. Ultrasound of liver. Echogenic consistent with fatty liver. No liver mass lesions. No dilated bile ducts. No ascites. 9/2020. CT scan abdomen with IV contrast.  Changes consistent with fatty liver. No liver mass lesions. Normal appearing liver with areas of focal fat. Normal spleen. No ascites. 9/2020. Dynamic MRI of th liver. Changes consistent with fatty liver. No liver mass lesions. Normal spleen. No ascites. 04/2021. Ultrasound of the liver. Slightly coarsened echotexture. Portal vein normal caliber with antegrade flow. No focal abnormality. 10/2021. Ultrasound of the liver. Hepatic steatosis. No focal hepatic lesion. OTHER TESTING:  Not available or performed    FOLLOW-UP:  All of the issues listed above in the Assessment and Plan were discussed with the patient. All questions were answered. The patient expressed a clear understanding of the above. [address] in 6 months.      ANA Rowe  Liver [place] [address], [address] [place] Riddle, [address] [facility]   [phone]

## 2021-12-01 LAB
AFP L3 MFR SERPL: NORMAL % (ref 0–9.9)
AFP SERPL-MCNC: 4.7 NG/ML (ref 0–8)

## 2022-03-18 PROBLEM — Z90.49 HISTORY OF CHOLECYSTECTOMY: Status: ACTIVE | Noted: 2020-11-09

## 2022-03-18 PROBLEM — I10 HYPERTENSION: Status: ACTIVE | Noted: 2020-11-09

## 2022-03-19 PROBLEM — F41.9 ANXIETY: Status: ACTIVE | Noted: 2020-11-09

## 2022-03-19 PROBLEM — K43.9 VENTRAL HERNIA: Status: ACTIVE | Noted: 2020-11-09

## 2022-03-19 PROBLEM — J45.909 ASTHMA: Status: ACTIVE | Noted: 2020-11-09

## 2022-03-19 PROBLEM — K74.60 CIRRHOSIS (HCC): Status: ACTIVE | Noted: 2021-02-07

## 2022-03-19 PROBLEM — K74.3 PRIMARY BILIARY CHOLANGITIS (HCC): Status: ACTIVE | Noted: 2020-11-09

## 2022-03-19 PROBLEM — K21.9 GERD (GASTROESOPHAGEAL REFLUX DISEASE): Status: ACTIVE | Noted: 2020-11-09

## 2022-03-20 PROBLEM — K75.81 NASH (NONALCOHOLIC STEATOHEPATITIS): Status: ACTIVE | Noted: 2021-02-07

## 2022-06-02 ENCOUNTER — HOSPITAL ENCOUNTER (OUTPATIENT)
Dept: ULTRASOUND IMAGING | Age: 52
Discharge: HOME | End: 2022-06-02
Payer: COMMERCIAL

## 2022-06-02 DIAGNOSIS — K74.3 PRIMARY BILIARY CHOLANGITIS (HCC): ICD-10-CM

## 2022-06-02 PROCEDURE — 76705 ECHO EXAM OF ABDOMEN: CPT

## 2022-08-02 RX ORDER — URSODIOL 500 MG/1
1000 TABLET, FILM COATED ORAL 2 TIMES DAILY
Qty: 120 TABLET | Refills: 11 | Status: SHIPPED | OUTPATIENT
Start: 2022-08-02

## 2022-08-18 ENCOUNTER — HOSPITAL ENCOUNTER (OUTPATIENT)
Dept: LAB | Age: 52
Discharge: HOME | End: 2022-08-18
Payer: COMMERCIAL

## 2022-08-18 ENCOUNTER — OFFICE VISIT (OUTPATIENT)
Dept: HEMATOLOGY | Age: 52
End: 2022-08-18
Payer: COMMERCIAL

## 2022-08-18 VITALS
TEMPERATURE: 98.3 F | RESPIRATION RATE: 16 BRPM | BODY MASS INDEX: 41.12 KG/M2 | WEIGHT: 262 LBS | HEIGHT: 67 IN | DIASTOLIC BLOOD PRESSURE: 90 MMHG | HEART RATE: 108 BPM | SYSTOLIC BLOOD PRESSURE: 136 MMHG | OXYGEN SATURATION: 98 %

## 2022-08-18 DIAGNOSIS — K74.3 PRIMARY BILIARY CHOLANGITIS (HCC): ICD-10-CM

## 2022-08-18 DIAGNOSIS — K74.3 PRIMARY BILIARY CHOLANGITIS (HCC): Primary | ICD-10-CM

## 2022-08-18 LAB
ALBUMIN SERPL-MCNC: 3.7 G/DL (ref 3.4–5)
ALBUMIN/GLOB SERPL: 0.9 {RATIO} (ref 0.8–1.7)
ALP SERPL-CCNC: 153 U/L (ref 45–117)
ALT SERPL-CCNC: 51 U/L (ref 13–56)
ANION GAP SERPL CALC-SCNC: 8 MMOL/L (ref 3–18)
AST SERPL-CCNC: 45 U/L (ref 10–38)
BASOPHILS # BLD: 0.1 K/UL (ref 0–0.1)
BASOPHILS NFR BLD: 1 % (ref 0–2)
BILIRUB DIRECT SERPL-MCNC: 0.1 MG/DL (ref 0–0.2)
BILIRUB SERPL-MCNC: 0.4 MG/DL (ref 0.2–1)
BUN SERPL-MCNC: 17 MG/DL (ref 7–18)
BUN/CREAT SERPL: 25 (ref 12–20)
CALCIUM SERPL-MCNC: 9 MG/DL (ref 8.5–10.1)
CHLORIDE SERPL-SCNC: 106 MMOL/L (ref 100–111)
CO2 SERPL-SCNC: 24 MMOL/L (ref 21–32)
CREAT SERPL-MCNC: 0.69 MG/DL (ref 0.6–1.3)
DIFFERENTIAL METHOD BLD: ABNORMAL
EOSINOPHIL # BLD: 0.3 K/UL (ref 0–0.4)
EOSINOPHIL NFR BLD: 3 % (ref 0–5)
ERYTHROCYTE [DISTWIDTH] IN BLOOD BY AUTOMATED COUNT: 13.8 % (ref 11.6–14.5)
GLOBULIN SER CALC-MCNC: 4.3 G/DL (ref 2–4)
GLUCOSE SERPL-MCNC: 135 MG/DL (ref 74–99)
HCT VFR BLD AUTO: 43.5 % (ref 35–45)
HGB BLD-MCNC: 13.8 G/DL (ref 12–16)
IMM GRANULOCYTES # BLD AUTO: 0 K/UL (ref 0–0.04)
IMM GRANULOCYTES NFR BLD AUTO: 0 % (ref 0–0.5)
INR PPP: 0.9 (ref 0.8–1.2)
LYMPHOCYTES # BLD: 4.1 K/UL (ref 0.9–3.6)
LYMPHOCYTES NFR BLD: 39 % (ref 21–52)
MCH RBC QN AUTO: 28.4 PG (ref 24–34)
MCHC RBC AUTO-ENTMCNC: 31.7 G/DL (ref 31–37)
MCV RBC AUTO: 89.5 FL (ref 78–100)
MONOCYTES # BLD: 0.8 K/UL (ref 0.05–1.2)
MONOCYTES NFR BLD: 8 % (ref 3–10)
NEUTS SEG # BLD: 5.2 K/UL (ref 1.8–8)
NEUTS SEG NFR BLD: 49 % (ref 40–73)
NRBC # BLD: 0 K/UL (ref 0–0.01)
NRBC BLD-RTO: 0 PER 100 WBC
PLATELET # BLD AUTO: 396 K/UL (ref 135–420)
PMV BLD AUTO: 10.3 FL (ref 9.2–11.8)
POTASSIUM SERPL-SCNC: 4.1 MMOL/L (ref 3.5–5.5)
PROT SERPL-MCNC: 8 G/DL (ref 6.4–8.2)
PROTHROMBIN TIME: 12.3 SEC (ref 11.5–15.2)
RBC # BLD AUTO: 4.86 M/UL (ref 4.2–5.3)
SODIUM SERPL-SCNC: 138 MMOL/L (ref 136–145)
WBC # BLD AUTO: 10.5 K/UL (ref 4.6–13.2)

## 2022-08-18 PROCEDURE — 36415 COLL VENOUS BLD VENIPUNCTURE: CPT

## 2022-08-18 PROCEDURE — 82107 ALPHA-FETOPROTEIN L3: CPT

## 2022-08-18 PROCEDURE — 85610 PROTHROMBIN TIME: CPT

## 2022-08-18 PROCEDURE — 85025 COMPLETE CBC W/AUTO DIFF WBC: CPT

## 2022-08-18 PROCEDURE — 80048 BASIC METABOLIC PNL TOTAL CA: CPT

## 2022-08-18 PROCEDURE — 99214 OFFICE O/P EST MOD 30 MIN: CPT | Performed by: NURSE PRACTITIONER

## 2022-08-18 PROCEDURE — 80076 HEPATIC FUNCTION PANEL: CPT

## 2022-08-18 RX ORDER — FUROSEMIDE 20 MG/1
20 TABLET ORAL DAILY
Qty: 90 TABLET | Refills: 1 | Status: SHIPPED | OUTPATIENT
Start: 2022-08-18

## 2022-08-18 RX ORDER — SPIRONOLACTONE 50 MG/1
50 TABLET, FILM COATED ORAL DAILY
Qty: 90 TABLET | Refills: 1 | Status: SHIPPED | OUTPATIENT
Start: 2022-08-18

## 2022-08-18 NOTE — PROGRESS NOTES
Critical access [facility], MD, FACP, [facility] [address] [place], [place]      ABHILASH NicholsonC    Ana Burr, ACNP-BC     April S Luis Enrique, [facility] PhoenixNP-BC   Shania Omalley FNP-C    Melissa Olmos, [facility]       Felecia Javed [facility] 136    at [address], [address] [place] [address], [facility] 22.    [phone]    FAX: [address] [place] [address], [address]    [phone]    FAX: [phone]       Patient Care Team:  Raj Bosch as PCP - General (Physician Assistant)      Problem List  Date Reviewed: 11/30/2021            Codes Class Noted    BLANCO (nonalcoholic steatohepatitis) ICD-10-CM: K75.81  ICD-9-CM: 571.8  2/7/2021        Cirrhosis ([facility] ICD-10-CM: K74.60  ICD-9-CM: 571.5  2/7/2021        Primary biliary cholangitis ([facility] ICD-10-CM: K74.3  ICD-9-CM: 571.6  11/9/2020        History of cholecystectomy ICD-10-CM: Z90.49  ICD-9-CM: V45.79  11/9/2020        Hypertension ICD-10-CM: I10  ICD-9-CM: 401.9  11/9/2020        Ventral hernia ICD-10-CM: K43.9  ICD-9-CM: 553.20  11/9/2020        Asthma ICD-10-CM: J45.909  ICD-9-CM: 493.90  11/9/2020        GERD (gastroesophageal reflux disease) ICD-10-CM: K21.9  ICD-9-CM: 530.81  11/9/2020        Anxiety ICD-10-CM: F41.9  ICD-9-CM: 300.00  11/9/2020             Karthik Arnold is being seen at [place] [address] for management of Primary Biliary Cholangitis and BLANCO. The active problem list, all pertinent past medical history, medications, radiologic findings and laboratory findings related to the liver disorder were reviewed with the patient. The patient is a 46 y.o.   female who was found to have elevated  liver enzymes in 2015      Serologic evaluation for markers of chronic liver disease was positive for AMA MRI of the liver was performed in 9/2020. The results of the imaging suggested fatty liver disease. The patient underwent a liver biopsy in 1/2021. This demonstrates bile duct changes consistent with PBC and florid bile duct lesions. There is also BLANCO with 66-75% steatosis, severe inflammation, severe ballooning, Miguelina bodies and cirrhosis. Fibroscan analysis performed in 11/2021 confirms cirrhosis and fatty liver disease. The patient has the following symptoms which are thought to be due to the liver disease:  fatigue, pain in the right side over the liver, dry mouth, arthralgias, myalgias. The patient is not currently experiencing the following symptoms of liver disease:  dry eyes, dry mouth, arthralgias, myalgias, swelling of the lower extremities, hematemesis, hematochezia. The patient has Moderate limitations in functional activities which can be attributed to the liver disease. Since the last office appointment:  Has lost 20 pounds over the last year. ASSESSMENT AND PLAN:  Primary Biliary Cholangitis  The diagnosis is based upon liver biopsy, serology, and an elevation in ALP. A liver biopsy was performed in 1/2021. This demonstrated bile duct lesions consistent with PBC, co-existent BLANCO and cirrhosis. Assessment of liver fibrosis was performed with Fibroscan in 12/2020. The result was 20.7 kPa which correlates with cirrhosis. The CAP score of 337 suggested this may be due to fatty liver. Fibroscan analysis performed in 11/2021 indicates both cirrhosis and fatty liver. The fibroscan will be repeated annually or as often as is clinically indicated to document fibrosis regression or progression. This will be performed when the patient returns for follow up in 4 months.       The most recent laboratory studies indicate that the liver transaminases are normal, alkaline phosphatase is elevated, tests of hepatic synthetic and metabolic function are normal, and the platelet count is normal.        The patient was started on ISABELLE in 01/2021 at a dose of 1000 mg BID. The side effects of ISABELLE including a 2% risk of itching and a 2% risk of diarrhea were discussed. BLANCO  The diagnosis is based upon liver biopsy, Fiboscan CAP score,     A liver biopsy performed in 1/2021 shows severe BLANCO with ARABELLA score (332) and cirrhosis. There is also changes consistent with PBC. Elastography performed in 12/2020 suggests Cirrhosis and fatty liver. Liver transaminases are elevated. ALP is elevated. Liver function is normal.  The platelet count is normal.    If the patient looses 20% of current body weight, which is 56 pounds, down to a weight of of 230 pounds, all steatosis will have resolved. Once all steatosis has resolved all inflammation will resolve. Then all fibrosis will gradually resolve and the liver could eventually be normal.    She has two separate liver diseases and liver biopsy suggests that the BLANCO is severe. It is imperative that she lose weight to at least remove one factor causing liver disease progression. Cirrhosis  Complications of cirrhosis were discussed in detail. We discussed thrombocytopenia, portal hypertension, varices, GI bleeding, peripheral edema, ascites, hepatic encephalopathy, and hepatocellular carcinoma. We discussed the need for follow ups on a regular basis, at 3 month intervals to monitor for complications. We discussed the need for every 6 month liver imaging studies. Screening for Esophageal varices   The patient had an EGD in 04/2021 by Dr. Marisel Ghosh. Her esophagus is normal. Pathology is negative. Repeat EGD in 3 years. Counseling for diet and weight loss in patients with confirmed or suspected NAFLD  The patient was counseled regarding diet and exercise to achieve weight loss.   The best diet for patients with fatty liver is one very low in carbohydrates and enriched with protein such as an Julienne's program.      The patient was told not to consume any food products and drinks containing fructose as this enhances hepatic fat synthesis. There is no medication or vitamin supplements that we advocate for BLANCO. Using glitazones in patients without diabetes mellitus has been shown to reduce fat content in the liver but has no effect on fibrosis and is associated with weight gain. Vitamin E has also been used but the data is not very good and most experts no longer advocate this. Screening for Hepatocellular Carcinoma  HCC screening has recently been performed and does not suggest [facility] The next liver imaging study will be performed in 12/2022. This was ordered today. Ultrasound and AFP-L3% were ordered. Treatment of other medical problems in patients with chronic liver disease  There are no contraindications for the patient to take most medications that are necessary for treatment of other medical issues. The patient has cirrhrosis and should avoid taking NSAIDs which are associated with a higher rate of developing MARCUS. The patient can take any medications utilized for treatment of DM, Statins to treat hypercholesterolemia. The patient does not comsume alcohol on a daily basis. Normal doses of acetaminophen, as recommended on the label of the bottle, are not hepatotoxic except in the setting of daily alcohol use, even in patients with cirrhosis and can be utilized for pain. Counseling for alcohol in patients with chronic liver disease  The patient was counseled regarding alcohol consumption and the effect of alcohol on chronic liver disease. The patient does not consume any significant amount of alcohol. Vaccinations   Vaccination for viral hepatitis A and B is recommended since the patient has no serologic evidence of previous exposure or vaccination with immunity. Routine vaccinations against other bacterial and viral agents can be performed as indicated.   Annual flu vaccination should be administered if indicated. ALLERGIES  Allergies   Allergen Reactions    Lisinopril Anaphylaxis    Ciprofibrate Nausea and Vomiting       MEDICATIONS  Current Outpatient Medications   Medication Sig    ursodioL (ACTIGALL) 500 mg tablet TAKE 2 TABLETS BY MOUTH TWO (2) TIMES A DAY. pseudoephedrine (SUDAFED) 30 mg tablet Take 30 mg by mouth every six (6) hours as needed. ibuprofen (MOTRIN) 200 mg tablet Take 400 mg by mouth every eight (8) hours as needed for Pain. fexofenadine-pseudoephedrine (Allegra-D 24 Hour) 180-240 mg per tablet Take 1 Tab by mouth daily. amLODIPine (NORVASC) 5 mg tablet Take 5 mg by mouth daily. esomeprazole (NEXIUM) 40 mg capsule TAKE 1 CAPSULE BY MOUTH EVERY DAY    Flovent  mcg/actuation inhaler Take  by inhalation daily. losartan (COZAAR) 100 mg tablet 100 mg daily. PARoxetine (PAXIL) 20 mg tablet Take 40 mg by mouth daily. No current facility-administered medications for this visit. SYSTEM REVIEW NOT RELATED TO LIVER DISEASE OR REVIEWED ABOVE:  Constitution systems: Negative for fever, chills, weight gain, weight loss. Eyes: Negative for visual changes. ENT: Negative for sore throat, painful swallowing. Respiratory: Negative for cough, hemoptysis, SOB. Cardiology: Negative for chest pain, palpitations. GI:  Negative for constipation or diarrhea. : Negative for urinary frequency, dysuria, hematuria, nocturia. Skin: Negative for rash. Hematology: Negative for easy bruising, blood clots. Musculo-skelatal: Negative for back pain, muscle pain, weakness. Neurologic: Negative for headaches, dizziness, vertigo, memory problems not related to HE. Psychology: Negative for anxiety, depression. FAMILY HISTORY:  The father  of cirrhosis. The mother  of dementia. There is no family history of liver disease. SOCIAL HISTORY:  The patient is .     The patient has 2 children, 1 adopted child,   The patient has never used tobacco products. The patient has never consumed significant amounts of alcohol. The patient currently works part time as . General: No acute distress. Eyes: Sclera anicteric. ENT: No oral lesions. Thyroid normal.  Nodes: No adenopathy. Skin: No spider angiomata. No jaundice. No palmar erythema. Respiratory: Lungs clear to auscultation. Cardiovascular: Regular heart rate. No murmurs. No JVD. Abdomen: Soft non-tender. Liver size normal to percussion/palpation. Spleen not palpable. No obvious ascites. Extremities: No edema. No muscle wasting. No gross arthritic changes. Neurologic: Alert and oriented. Cranial nerves grossly intact. No asterixis.     LABORATORY STUDIES:  [place] [address] [address] Units 8/18/2022 11/30/2021   WBC 4.6 - 13.2 K/uL 10.5 8.1   ANC 1.8 - 8.0 K/UL 5.2 4.3   HGB 12.0 - 16.0 g/dL 13.8 13.2    - 420 K/uL 396 391   INR 0.8 - 1.2   0.9 1.0   AST 10 - 38 U/L 45 (H) 25   ALT 13 - 56 U/L 51 28   Alk Phos 45 - 117 U/L 153 (H) 174 (H)   Bili, Total 0.2 - 1.0 MG/DL 0.4 0.3   Bili, Direct 0.0 - 0.2 MG/DL 0.1 0.1   Albumin 3.4 - 5.0 g/dL 3.7 3.5   BUN 7.0 - 18 MG/DL 17 12   Creat 0.6 - 1.3 MG/DL 0.69 0.56 (L)   Na 136 - 145 mmol/L 138 139   K 3.5 - 5.5 mmol/L 4.1 4.5   Cl 100 - 111 mmol/L 106 104   CO2 21 - 32 mmol/L 24 27   Glucose 74 - 99 mg/dL 135 (H) 107 (H)     Liver [place] Latest Ref Rng & Units 5/27/2021   WBC 4.6 - 13.2 K/uL 9.9   ANC 1.8 - 8.0 K/UL 5.1   HGB 12.0 - 16.0 g/dL 13.3    - 420 K/uL 376   INR 0.8 - 1.2   0.9   AST 10 - 38 U/L 42 (H)   ALT 13 - 56 U/L 46   Alk Phos 45 - 117 U/L 195 (H)   Bili, Total 0.2 - 1.0 MG/DL 0.3   Bili, Direct 0.0 - 0.2 MG/DL 0.1   Albumin 3.4 - 5.0 g/dL 3.9   BUN 7.0 - 18 MG/DL 12   Creat 0.6 - 1.3 MG/DL 0.58 (L)   Na 136 - 145 mmol/L 140   K 3.5 - 5.5 mmol/L 4.6   Cl 100 - 111 mmol/L 106   CO2 21 - 32 mmol/L 28   Glucose 74 - 99 mg/dL 98     Cancer Screening Latest Ref Rng & Units 8/18/2022 11/30/2021 5/27/2021   AFP, Serum 0.0 - 9.2 ng/mL 3.9 4.7 4.6   AFP-L3% 0.0 - 9.9 % Comment Comment Comment     SEROLOGIES:  4/2015. HAV total negative, positive, HBsAntigen negative, anti-HCV negative, Ferritin 15, iron saturation 12%, ASMA negative, AMA strongly positive 110, ceruloplsmin 28, alpha-1-antitrypsin 159, TSH 2.46, T4 free 1.2    Serologies Latest Ref Rng & Units 12/1/2020   Hep A Ab, Total Negative   Negative   Hep B Core Ab, Total Negative   Negative   Hep B Surface Ab >10.0 mIU/mL <3.10 (L)   Hep B Surface Ab Interp POS   Negative (A)   Ferritin 8 - 388 NG/   Iron % Saturation 20 - 50 % 23   VALERIA, IFA  Negative   M2 Ab 0.0 - 20.0 Units 181.3 (H)     LIVER HISTOLOGY:  12/2020. FibroScan performed at [facility]. EkPa was 20.7. IQR/med 22%. . The results suggested a fibrosis level of F4. The CAP score suggests fatty liver. 1/2021. Slides reviewed by MLS. BLANCO. 66-75% macrovesicualr and micovesicular steatosis, Severe inflammation, Severe ballooning, Stage 4 fibrosis. ARABELLA (332). Bile duct changes consistent with PBC including florid bile duct lesions. Cirrhosis. 11/2021. FibroScan performed at [facility]. EkPa was 25.4. IQR/med 29%. . The results suggested a fibrosis level of F4. The CAP score suggests there is hepatic steatosis. ENDOSCOPIC PROCEDURES:  04/2021. EGD by Dr. Jennifer Camarillo. Normal esophagus. Pathology is negative. Repeat in 3 years. RADIOLOGY:  5/2018. Ultrasound of liver. Echogenic consistent with fatty liver. No liver mass lesions. No dilated bile ducts. No ascites. 9/2020. CT scan abdomen with IV contrast.  Changes consistent with fatty liver. No liver mass lesions. Normal appearing liver with areas of focal fat. Normal spleen. No ascites. 9/2020. Dynamic MRI of th liver. Changes consistent with fatty liver. No liver mass lesions. Normal spleen. No ascites. 04/2021. Ultrasound of the liver. Slightly coarsened echotexture. Portal vein normal caliber with antegrade flow. No focal abnormality. 10/2021. Ultrasound of the liver. Hepatic steatosis. No focal hepatic lesion. 06/2022. Ultrasound of the liver. Hepatic steatosis. No focal hepatic lesion. OTHER TESTING:  Not available or performed    FOLLOW-UP:  All of the issues listed above in the Assessment and Plan were discussed with the patient. All questions were answered. The patient expressed a clear understanding of the above. [address] in 4 months for fibroscan and continued monitoring. She will have the ultrasound completed prior.       MORENA ZhuP-C  [facility]  [address], [address] [place] [address], [address] [place] [address]   [phone]

## 2022-08-19 LAB
AFP L3 MFR SERPL: NORMAL % (ref 0–9.9)
AFP SERPL-MCNC: 3.9 NG/ML (ref 0–9.2)

## 2023-01-09 ENCOUNTER — TRANSCRIBE ORDER (OUTPATIENT)
Dept: SCHEDULING | Age: 53
End: 2023-01-09

## 2023-01-09 DIAGNOSIS — Z12.31 SCREENING MAMMOGRAM, ENCOUNTER FOR: Primary | ICD-10-CM

## 2023-01-16 ENCOUNTER — HOSPITAL ENCOUNTER (OUTPATIENT)
Dept: ULTRASOUND IMAGING | Age: 53
Discharge: HOME | End: 2023-01-16
Payer: COMMERCIAL

## 2023-01-16 DIAGNOSIS — K74.3 PRIMARY BILIARY CHOLANGITIS (HCC): ICD-10-CM

## 2023-01-16 PROCEDURE — 76705 ECHO EXAM OF ABDOMEN: CPT

## 2023-01-19 ENCOUNTER — OFFICE VISIT (OUTPATIENT)
Dept: HEMATOLOGY | Age: 53
End: 2023-01-19
Payer: COMMERCIAL

## 2023-01-19 ENCOUNTER — HOSPITAL ENCOUNTER (OUTPATIENT)
Dept: LAB | Age: 53
Discharge: HOME | End: 2023-01-19
Payer: COMMERCIAL

## 2023-01-19 VITALS
SYSTOLIC BLOOD PRESSURE: 131 MMHG | DIASTOLIC BLOOD PRESSURE: 66 MMHG | TEMPERATURE: 96.9 F | BODY MASS INDEX: 41.75 KG/M2 | HEIGHT: 67 IN | WEIGHT: 266 LBS | HEART RATE: 90 BPM | OXYGEN SATURATION: 98 %

## 2023-01-19 DIAGNOSIS — K74.3 PRIMARY BILIARY CHOLANGITIS (HCC): ICD-10-CM

## 2023-01-19 DIAGNOSIS — K74.3 PRIMARY BILIARY CHOLANGITIS (HCC): Primary | ICD-10-CM

## 2023-01-19 LAB
ALBUMIN SERPL-MCNC: 3.9 G/DL (ref 3.4–5)
ALBUMIN/GLOB SERPL: 1 (ref 0.8–1.7)
ALP SERPL-CCNC: 169 U/L (ref 45–117)
ALT SERPL-CCNC: 46 U/L (ref 13–56)
ANION GAP SERPL CALC-SCNC: 6 MMOL/L (ref 3–18)
AST SERPL-CCNC: 34 U/L (ref 10–38)
BASOPHILS # BLD: 0.1 K/UL (ref 0–0.1)
BASOPHILS NFR BLD: 1 % (ref 0–2)
BILIRUB DIRECT SERPL-MCNC: 0.2 MG/DL (ref 0–0.2)
BILIRUB SERPL-MCNC: 0.6 MG/DL (ref 0.2–1)
BUN SERPL-MCNC: 19 MG/DL (ref 7–18)
BUN/CREAT SERPL: 31 (ref 12–20)
CALCIUM SERPL-MCNC: 9.7 MG/DL (ref 8.5–10.1)
CHLORIDE SERPL-SCNC: 108 MMOL/L (ref 100–111)
CO2 SERPL-SCNC: 27 MMOL/L (ref 21–32)
CREAT SERPL-MCNC: 0.62 MG/DL (ref 0.6–1.3)
DIFFERENTIAL METHOD BLD: ABNORMAL
EOSINOPHIL # BLD: 0.3 K/UL (ref 0–0.4)
EOSINOPHIL NFR BLD: 3 % (ref 0–5)
ERYTHROCYTE [DISTWIDTH] IN BLOOD BY AUTOMATED COUNT: 14.2 % (ref 11.6–14.5)
GLOBULIN SER CALC-MCNC: 3.9 G/DL (ref 2–4)
GLUCOSE SERPL-MCNC: 98 MG/DL (ref 74–99)
HCT VFR BLD AUTO: 41 % (ref 35–45)
HGB BLD-MCNC: 13.4 G/DL (ref 12–16)
IMM GRANULOCYTES # BLD AUTO: 0 K/UL (ref 0–0.04)
IMM GRANULOCYTES NFR BLD AUTO: 0 % (ref 0–0.5)
INR PPP: 0.9 (ref 0.8–1.2)
LYMPHOCYTES # BLD: 4 K/UL (ref 0.9–3.6)
LYMPHOCYTES NFR BLD: 38 % (ref 21–52)
MCH RBC QN AUTO: 28.6 PG (ref 24–34)
MCHC RBC AUTO-ENTMCNC: 32.7 G/DL (ref 31–37)
MCV RBC AUTO: 87.6 FL (ref 78–100)
MONOCYTES # BLD: 0.7 K/UL (ref 0.05–1.2)
MONOCYTES NFR BLD: 7 % (ref 3–10)
NEUTS SEG # BLD: 5.5 K/UL (ref 1.8–8)
NEUTS SEG NFR BLD: 52 % (ref 40–73)
NRBC # BLD: 0 K/UL (ref 0–0.01)
NRBC BLD-RTO: 0 PER 100 WBC
PLATELET # BLD AUTO: 402 K/UL (ref 135–420)
PMV BLD AUTO: 10 FL (ref 9.2–11.8)
POTASSIUM SERPL-SCNC: 4 MMOL/L (ref 3.5–5.5)
PROT SERPL-MCNC: 7.8 G/DL (ref 6.4–8.2)
PROTHROMBIN TIME: 12.6 SEC (ref 11.5–15.2)
RBC # BLD AUTO: 4.68 M/UL (ref 4.2–5.3)
SODIUM SERPL-SCNC: 141 MMOL/L (ref 136–145)
WBC # BLD AUTO: 10.7 K/UL (ref 4.6–13.2)

## 2023-01-19 PROCEDURE — 3074F SYST BP LT 130 MM HG: CPT | Performed by: NURSE PRACTITIONER

## 2023-01-19 PROCEDURE — 3078F DIAST BP <80 MM HG: CPT | Performed by: NURSE PRACTITIONER

## 2023-01-19 PROCEDURE — 99214 OFFICE O/P EST MOD 30 MIN: CPT | Performed by: NURSE PRACTITIONER

## 2023-01-19 PROCEDURE — 85610 PROTHROMBIN TIME: CPT

## 2023-01-19 PROCEDURE — 80076 HEPATIC FUNCTION PANEL: CPT

## 2023-01-19 PROCEDURE — 82107 ALPHA-FETOPROTEIN L3: CPT

## 2023-01-19 PROCEDURE — 80048 BASIC METABOLIC PNL TOTAL CA: CPT

## 2023-01-19 PROCEDURE — 91200 LIVER ELASTOGRAPHY: CPT | Performed by: NURSE PRACTITIONER

## 2023-01-19 PROCEDURE — 36415 COLL VENOUS BLD VENIPUNCTURE: CPT

## 2023-01-19 PROCEDURE — 85025 COMPLETE CBC W/AUTO DIFF WBC: CPT

## 2023-01-19 NOTE — PROGRESS NOTES
[address] [place] [address], MD, FACP, Chago Wolfgang[place], [place]      SALO Rosado, ACNP-BC     Nicolasa Dudley [facility]   ANA Finch [facility]       Felecia Deputado [address]    at [address], [address] [place] [address], [facility] 22.    [phone]    FAX: [address] [place] [address]    at [address], [address] [place] [address] - [address]    [phone]    FAX: [phone]       Patient Care Team:  Vianca Villafuerte as PCP - General (Physician Assistant)      Problem List  Date Reviewed: 1/19/2023            Codes Class Noted    BLANCO (nonalcoholic steatohepatitis) ICD-10-CM: K75.81  ICD-9-CM: 571.8  2/7/2021        Cirrhosis ([facility] [address] ICD-10-CM: K74.60  ICD-9-CM: 571.5  2/7/2021        Primary biliary cholangitis ([facility] [address] ICD-10-CM: K74.3  ICD-9-CM: 571.6  11/9/2020        History of cholecystectomy ICD-10-CM: Z90.49  ICD-9-CM: V45.79  11/9/2020        Hypertension ICD-10-CM: I10  ICD-9-CM: 401.9  11/9/2020        Ventral hernia ICD-10-CM: K43.9  ICD-9-CM: 553.20  11/9/2020        Asthma ICD-10-CM: J45.909  ICD-9-CM: 493.90  11/9/2020        GERD (gastroesophageal reflux disease) ICD-10-CM: K21.9  ICD-9-CM: 530.81  11/9/2020        Anxiety ICD-10-CM: F41.9  ICD-9-CM: 300.00  11/9/2020             Susana Villafuerte is being seen at [place] [address] for management of Primary Biliary Cholangitis and BLANCO. The active problem list, all pertinent past medical history, medications, radiologic findings and laboratory findings related to the liver disorder were reviewed with the patient. The patient is a 45 y.o.   female who was found to have elevated  liver enzymes in 2015      Serologic evaluation for markers of chronic liver disease was positive for AMA MRI of the liver was performed in 9/2020. The results of the imaging suggested fatty liver disease. The patient underwent a liver biopsy in 1/2021. This demonstrates bile duct changes consistent with PBC and florid bile duct lesions. There is also BLANCO with 66-75% steatosis, severe inflammation, severe ballooning, Miguelina bodies and cirrhosis. Fibroscan analysis performed in 11/2021 confirms cirrhosis and fatty liver disease. The patient has the following symptoms which are thought to be due to the liver disease:  fatigue, pain in the right side over the liver, dry mouth, arthralgias, myalgias. The patient is not currently experiencing the following symptoms of liver disease:  dry eyes, dry mouth, arthralgias, myalgias, swelling of the lower extremities, hematemesis, hematochezia. The patient has Moderate limitations in functional activities which can be attributed to the liver disease. Since the last office appointment:  Weight stable. ASSESSMENT AND PLAN:  Primary Biliary Cholangitis  The diagnosis is based upon liver biopsy, serology, and an elevation in ALP. A liver biopsy was performed in 1/2021. This demonstrated bile duct lesions consistent with PBC, co-existent BLANCO and cirrhosis. Assessment of liver fibrosis was performed with Fibroscan in 12/2020. The result was 20.7 kPa which correlates with cirrhosis. The CAP score of 337 suggested this may be due to fatty liver. Fibroscan analysis performed in 11/2021 indicates both cirrhosis and fatty liver. The fibroscan will be repeated annually or as often as is clinically indicated to document fibrosis regression or progression. This will be performed when the patient returns for follow up in 4 months.       The most recent laboratory studies indicate that the liver transaminases are normal, alkaline phosphatase is elevated, tests of hepatic synthetic and metabolic function are normal, and the platelet count is normal.        The patient was started on ISABELLE in 01/2021 at a dose of 1000 mg BID. The side effects of ISABELLE including a 2% risk of itching and a 2% risk of diarrhea were discussed. BLANCO  The diagnosis is based upon liver biopsy, Fiboscan CAP score,     A liver biopsy performed in 1/2021 shows severe BLANCO with ARABELLA score (332) and cirrhosis. There is also changes consistent with PBC. Elastography performed in 12/2020 suggests Cirrhosis and fatty liver. Liver transaminases are elevated. ALP is elevated. Liver function is normal.  The platelet count is normal.    If the patient looses 20% of current body weight, which is 56 pounds, down to a weight of of 230 pounds, all steatosis will have resolved. Once all steatosis has resolved all inflammation will resolve. Then all fibrosis will gradually resolve and the liver could eventually be normal.    She has two separate liver diseases and liver biopsy suggests that the BLANCO is severe. It is imperative that she lose weight to at least remove one factor causing liver disease progression. Cirrhosis  Complications of cirrhosis were discussed in detail. We discussed thrombocytopenia, portal hypertension, varices, GI bleeding, peripheral edema, ascites, hepatic encephalopathy, and hepatocellular carcinoma. We discussed the need for follow ups on a regular basis, at 3 month intervals to monitor for complications. We discussed the need for every 6 month liver imaging studies. Screening for Esophageal varices   The patient had an EGD in 04/2021 by Dr. Yovanny Weiner. Her esophagus is normal. Pathology is negative. Repeat EGD in 3 years. Counseling for diet and weight loss in patients with confirmed or suspected NAFLD  The patient was counseled regarding diet and exercise to achieve weight loss.   The best diet for patients with fatty liver is one very low in carbohydrates and enriched with protein such as an Julienne's program.      The patient was told not to consume any food products and drinks containing fructose as this enhances hepatic fat synthesis. There is no medication or vitamin supplements that we advocate for BLANCO. Using glitazones in patients without diabetes mellitus has been shown to reduce fat content in the liver but has no effect on fibrosis and is associated with weight gain. Vitamin E has also been used but the data is not very good and most experts no longer advocate this. Screening for Hepatocellular Carcinoma  HCC screening has recently been performed and does not suggest [facility] [address] The next liver imaging study will be performed in 12/2022. This was ordered today. Ultrasound and AFP-L3% were ordered. Treatment of other medical problems in patients with chronic liver disease  There are no contraindications for the patient to take most medications that are necessary for treatment of other medical issues. The patient has cirrhrosis and should avoid taking NSAIDs which are associated with a higher rate of developing MARCUS. The patient can take any medications utilized for treatment of DM, Statins to treat hypercholesterolemia. The patient does not comsume alcohol on a daily basis. Normal doses of acetaminophen, as recommended on the label of the bottle, are not hepatotoxic except in the setting of daily alcohol use, even in patients with cirrhosis and can be utilized for pain. Counseling for alcohol in patients with chronic liver disease  The patient was counseled regarding alcohol consumption and the effect of alcohol on chronic liver disease. The patient does not consume any significant amount of alcohol. Vaccinations   Vaccination for viral hepatitis A and B is recommended since the patient has no serologic evidence of previous exposure or vaccination with immunity. Routine vaccinations against other bacterial and viral agents can be performed as indicated.   Annual flu vaccination should be administered if indicated. ALLERGIES  Allergies   Allergen Reactions    Lisinopril Anaphylaxis    Ciprofibrate Nausea and Vomiting       MEDICATIONS  Current Outpatient Medications   Medication Sig    furosemide (LASIX) 20 mg tablet Take 1 Tablet by mouth daily. spironolactone (ALDACTONE) 50 mg tablet Take 1 Tablet by mouth daily. ursodioL (ACTIGALL) 500 mg tablet TAKE 2 TABLETS BY MOUTH TWO (2) TIMES A DAY. pseudoephedrine (SUDAFED) 30 mg tablet Take 30 mg by mouth every six (6) hours as needed. ibuprofen (MOTRIN) 200 mg tablet Take 400 mg by mouth every eight (8) hours as needed for Pain. fexofenadine-pseudoephedrine (Allegra-D 24 Hour) 180-240 mg per tablet Take 1 Tab by mouth daily. amLODIPine (NORVASC) 5 mg tablet Take 5 mg by mouth daily. esomeprazole (NEXIUM) 40 mg capsule TAKE 1 CAPSULE BY MOUTH EVERY DAY    Flovent  mcg/actuation inhaler Take  by inhalation daily. losartan (COZAAR) 100 mg tablet 100 mg daily. PARoxetine (PAXIL) 20 mg tablet Take 40 mg by mouth daily. No current facility-administered medications for this visit. SYSTEM REVIEW NOT RELATED TO LIVER DISEASE OR REVIEWED ABOVE:  Constitution systems: Negative for fever, chills, weight gain, weight loss. Eyes: Negative for visual changes. ENT: Negative for sore throat, painful swallowing. Respiratory: Negative for cough, hemoptysis, SOB. Cardiology: Negative for chest pain, palpitations. GI:  Negative for constipation or diarrhea. : Negative for urinary frequency, dysuria, hematuria, nocturia. Skin: Negative for rash. Hematology: Negative for easy bruising, blood clots. Musculo-skelatal: Negative for back pain, muscle pain, weakness. Neurologic: Negative for headaches, dizziness, vertigo, memory problems not related to HE. Psychology: Negative for anxiety, depression. FAMILY HISTORY:  The father  of cirrhosis. The mother  of dementia. There is no family history of liver disease. SOCIAL HISTORY:  The patient is . The patient has 2 children, 1 adopted child. The patient has never used tobacco products. The patient has never consumed significant amounts of alcohol. The patient currently works part time as . General: No acute distress. Eyes: Sclera anicteric. ENT: No oral lesions. Thyroid normal.  Nodes: No adenopathy. Skin: No spider angiomata. No jaundice. No palmar erythema. Respiratory: Lungs clear to auscultation. Cardiovascular: Regular heart rate. No murmurs. No JVD. Abdomen: Soft non-tender. Liver size normal to percussion/palpation. Spleen not palpable. No obvious ascites. Extremities: No edema. No muscle wasting. No gross arthritic changes. Neurologic: Alert and oriented. Cranial nerves grossly intact. No asterixis.     LABORATORY STUDIES:  [address] Units 8/18/2022 11/30/2021   WBC 4.6 - 13.2 K/uL 10.5 8.1   ANC 1.8 - 8.0 K/UL 5.2 4.3   HGB 12.0 - 16.0 g/dL 13.8 13.2    - 420 K/uL 396 391   INR 0.8 - 1.2   0.9 1.0   AST 10 - 38 U/L 45 (H) 25   ALT 13 - 56 U/L 51 28   Alk Phos 45 - 117 U/L 153 (H) 174 (H)   Bili, Total 0.2 - 1.0 MG/DL 0.4 0.3   Bili, Direct 0.0 - 0.2 MG/DL 0.1 0.1   Albumin 3.4 - 5.0 g/dL 3.7 3.5   BUN 7.0 - 18 MG/DL 17 12   Creat 0.6 - 1.3 MG/DL 0.69 0.56 (L)   Na 136 - 145 mmol/L 138 139   K 3.5 - 5.5 mmol/L 4.1 4.5   Cl 100 - 111 mmol/L 106 104   CO2 21 - 32 mmol/L 24 27   Glucose 74 - 99 mg/dL 135 (H) 107 (H)     [facility] Latest Ref Rng & Units 5/27/2021   WBC 4.6 - 13.2 K/uL 9.9   ANC 1.8 - 8.0 K/UL 5.1   HGB 12.0 - 16.0 g/dL 13.3    - 420 K/uL 376   INR 0.8 - 1.2   0.9   AST 10 - 38 U/L 42 (H)   ALT 13 - 56 U/L 46   Alk Phos 45 - 117 U/L 195 (H)   Bili, Total 0.2 - 1.0 MG/DL 0.3   Bili, Direct 0.0 - 0.2 MG/DL 0.1   Albumin 3.4 - 5.0 g/dL 3.9   BUN 7.0 - 18 MG/DL 12   Creat 0.6 - 1.3 MG/DL 0.58 (L)   Na 136 - 145 mmol/L 140   K 3.5 - 5.5 mmol/L 4.6   Cl 100 - 111 mmol/L 106   CO2 21 - 32 mmol/L 28   Glucose 74 - 99 mg/dL 98     Cancer Screening Latest Ref Rng & Units 8/18/2022 11/30/2021 5/27/2021   AFP, Serum 0.0 - 9.2 ng/mL 3.9 4.7 4.6   AFP-L3% 0.0 - 9.9 % Comment Comment Comment     SEROLOGIES:  4/2015. HAV total negative, positive, HBsAntigen negative, anti-HCV negative, Ferritin 15, iron saturation 12%, ASMA negative, AMA strongly positive 110, ceruloplsmin 28, alpha-1-antitrypsin 159, TSH 2.46, T4 free 1.2    Serologies Latest Ref Rng & Units 12/1/2020   Hep A Ab, Total Negative   Negative   Hep B Core Ab, Total Negative   Negative   Hep B Surface Ab >10.0 mIU/mL <3.10 (L)   Hep B Surface Ab Interp POS   Negative (A)   Ferritin 8 - 388 NG/   Iron % Saturation 20 - 50 % 23   VALERIA, IFA  Negative   M2 Ab 0.0 - 20.0 Units 181.3 (H)     LIVER HISTOLOGY:  12/2020. FibroScan performed at [facility]. EkPa was 20.7. IQR/med 22%. . The results suggested a fibrosis level of F4. The CAP score suggests fatty liver. 1/2021. Slides reviewed by MLS. BLANCO. 66-75% macrovesicualr and micovesicular steatosis, Severe inflammation, Severe ballooning, Stage 4 fibrosis. ARABELLA (332). Bile duct changes consistent with PBC including florid bile duct lesions. Cirrhosis. 11/2021. FibroScan performed at [facility]. EkPa was 25.4. IQR/med 29%. . The results suggested a fibrosis level of F4. The CAP score suggests there is hepatic steatosis. ENDOSCOPIC PROCEDURES:  04/2021. EGD by Dr. Aaron Leigh. Normal esophagus. Pathology is negative. Repeat in 3 years. RADIOLOGY:  5/2018. Ultrasound of liver. Echogenic consistent with fatty liver. No liver mass lesions. No dilated bile ducts. No ascites. 9/2020. CT scan abdomen with IV contrast.  Changes consistent with fatty liver. No liver mass lesions. Normal appearing liver with areas of focal fat. Normal spleen. No ascites. 9/2020.   Dynamic MRI of th liver. Changes consistent with fatty liver. No liver mass lesions. Normal spleen. No ascites. 04/2021. Ultrasound of the liver. Slightly coarsened echotexture. Portal vein normal caliber with antegrade flow. No focal abnormality. 10/2021. Ultrasound of the liver. Hepatic steatosis. No focal hepatic lesion. 06/2022. Ultrasound of the liver. Hepatic steatosis. No focal hepatic lesion. OTHER TESTING:  Not available or performed    FOLLOW-UP:  All of the issues listed above in the Assessment and Plan were discussed with the patient. All questions were answered. The patient expressed a clear understanding of the above. [address] in 4 months for fibroscan and continued monitoring. She will have the ultrasound completed prior.       GENEVA Schafer-TITI  [facility] [place] of [address], [address] [place] [address], [address] [place] [address]   [phone]

## 2023-01-24 LAB
AFP L3 MFR SERPL: NORMAL % (ref 0–9.9)
AFP SERPL-MCNC: 4.3 NG/ML (ref 0–9.2)

## 2023-02-01 DIAGNOSIS — K74.3 PRIMARY BILIARY CHOLANGITIS (HCC): Primary | ICD-10-CM

## 2023-02-01 DIAGNOSIS — Z12.31 SCREENING MAMMOGRAM, ENCOUNTER FOR: Primary | ICD-10-CM

## 2023-02-04 DIAGNOSIS — Z12.31 SCREENING MAMMOGRAM, ENCOUNTER FOR: Primary | ICD-10-CM

## 2023-02-05 DIAGNOSIS — K74.3 PRIMARY BILIARY CHOLANGITIS (HCC): Primary | ICD-10-CM

## 2023-02-06 RX ORDER — URSODIOL 500 MG/1
1000 TABLET, FILM COATED ORAL 2 TIMES DAILY
Qty: 120 TABLET | Refills: 11 | Status: SHIPPED | OUTPATIENT
Start: 2023-02-06

## 2023-06-19 ENCOUNTER — TELEPHONE (OUTPATIENT)
Age: 53
End: 2023-06-19

## 2023-06-19 NOTE — TELEPHONE ENCOUNTER
Left message regarding voicemail message received 6/5/23. Advised patient to contact the HR office is assistance is still needed.

## 2023-07-14 ENCOUNTER — HOSPITAL ENCOUNTER (OUTPATIENT)
Age: 53
Discharge: HOME | End: 2023-07-14
Payer: COMMERCIAL

## 2023-07-14 DIAGNOSIS — K74.3 PRIMARY BILIARY CHOLANGITIS (HCC): ICD-10-CM

## 2023-07-14 PROCEDURE — 76705 ECHO EXAM OF ABDOMEN: CPT

## 2023-07-19 ENCOUNTER — HOSPITAL ENCOUNTER (OUTPATIENT)
Facility: HOSPITAL | Age: 53
Setting detail: SPECIMEN
Discharge: HOME | End: 2023-07-22
Payer: COMMERCIAL

## 2023-07-19 ENCOUNTER — OFFICE VISIT (OUTPATIENT)
Age: 53
End: 2023-07-19
Payer: COMMERCIAL

## 2023-07-19 VITALS
HEART RATE: 89 BPM | WEIGHT: 268 LBS | DIASTOLIC BLOOD PRESSURE: 83 MMHG | TEMPERATURE: 96 F | HEIGHT: 67 IN | SYSTOLIC BLOOD PRESSURE: 145 MMHG | OXYGEN SATURATION: 98 % | BODY MASS INDEX: 42.06 KG/M2

## 2023-07-19 DIAGNOSIS — K74.3 PRIMARY BILIARY CIRRHOSIS (HCC): Primary | ICD-10-CM

## 2023-07-19 DIAGNOSIS — K74.3 PRIMARY BILIARY CIRRHOSIS (HCC): ICD-10-CM

## 2023-07-19 LAB
ALBUMIN SERPL-MCNC: 3.9 G/DL (ref 3.4–5)
ALBUMIN/GLOB SERPL: 1 (ref 0.8–1.7)
ALP SERPL-CCNC: 174 U/L (ref 45–117)
ALT SERPL-CCNC: 51 U/L (ref 13–56)
ANION GAP SERPL CALC-SCNC: 5 MMOL/L (ref 3–18)
AST SERPL-CCNC: 46 U/L (ref 10–38)
BASOPHILS # BLD: 0.1 K/UL (ref 0–0.1)
BASOPHILS NFR BLD: 1 % (ref 0–2)
BILIRUB DIRECT SERPL-MCNC: 0.1 MG/DL (ref 0–0.2)
BILIRUB SERPL-MCNC: 0.5 MG/DL (ref 0.2–1)
BUN SERPL-MCNC: 15 MG/DL (ref 7–18)
BUN/CREAT SERPL: 23 (ref 12–20)
CALCIUM SERPL-MCNC: 9.2 MG/DL (ref 8.5–10.1)
CHLORIDE SERPL-SCNC: 108 MMOL/L (ref 100–111)
CO2 SERPL-SCNC: 27 MMOL/L (ref 21–32)
CREAT SERPL-MCNC: 0.65 MG/DL (ref 0.6–1.3)
DIFFERENTIAL METHOD BLD: ABNORMAL
EOSINOPHIL # BLD: 0.5 K/UL (ref 0–0.4)
EOSINOPHIL NFR BLD: 5 % (ref 0–5)
ERYTHROCYTE [DISTWIDTH] IN BLOOD BY AUTOMATED COUNT: 13.9 % (ref 11.6–14.5)
GLOBULIN SER CALC-MCNC: 3.9 G/DL (ref 2–4)
GLUCOSE SERPL-MCNC: 101 MG/DL (ref 74–99)
HCT VFR BLD AUTO: 43 % (ref 35–45)
HGB BLD-MCNC: 13.7 G/DL (ref 12–16)
IMM GRANULOCYTES # BLD AUTO: 0 K/UL (ref 0–0.04)
IMM GRANULOCYTES NFR BLD AUTO: 0 % (ref 0–0.5)
INR PPP: 0.9 (ref 0.8–1.2)
LYMPHOCYTES # BLD: 3.5 K/UL (ref 0.9–3.6)
LYMPHOCYTES NFR BLD: 37 % (ref 21–52)
MCH RBC QN AUTO: 29 PG (ref 24–34)
MCHC RBC AUTO-ENTMCNC: 31.9 G/DL (ref 31–37)
MCV RBC AUTO: 90.9 FL (ref 78–100)
MONOCYTES # BLD: 0.6 K/UL (ref 0.05–1.2)
MONOCYTES NFR BLD: 7 % (ref 3–10)
NEUTS SEG # BLD: 4.7 K/UL (ref 1.8–8)
NEUTS SEG NFR BLD: 50 % (ref 40–73)
NRBC # BLD: 0 K/UL (ref 0–0.01)
NRBC BLD-RTO: 0 PER 100 WBC
PLATELET # BLD AUTO: 371 K/UL (ref 135–420)
PMV BLD AUTO: 10.3 FL (ref 9.2–11.8)
POTASSIUM SERPL-SCNC: 4.3 MMOL/L (ref 3.5–5.5)
PROT SERPL-MCNC: 7.8 G/DL (ref 6.4–8.2)
PROTHROMBIN TIME: 12.9 SEC (ref 11.5–15.2)
RBC # BLD AUTO: 4.73 M/UL (ref 4.2–5.3)
SODIUM SERPL-SCNC: 140 MMOL/L (ref 136–145)
WBC # BLD AUTO: 9.3 K/UL (ref 4.6–13.2)

## 2023-07-19 PROCEDURE — 3078F DIAST BP <80 MM HG: CPT | Performed by: NURSE PRACTITIONER

## 2023-07-19 PROCEDURE — 85610 PROTHROMBIN TIME: CPT

## 2023-07-19 PROCEDURE — 3074F SYST BP LT 130 MM HG: CPT | Performed by: NURSE PRACTITIONER

## 2023-07-19 PROCEDURE — 80048 BASIC METABOLIC PNL TOTAL CA: CPT

## 2023-07-19 PROCEDURE — 36415 COLL VENOUS BLD VENIPUNCTURE: CPT

## 2023-07-19 PROCEDURE — 85025 COMPLETE CBC W/AUTO DIFF WBC: CPT

## 2023-07-19 PROCEDURE — 80076 HEPATIC FUNCTION PANEL: CPT

## 2023-07-19 PROCEDURE — 99214 OFFICE O/P EST MOD 30 MIN: CPT | Performed by: NURSE PRACTITIONER

## 2023-07-19 PROCEDURE — 82107 ALPHA-FETOPROTEIN L3: CPT

## 2023-07-19 ASSESSMENT — PATIENT HEALTH QUESTIONNAIRE - PHQ9
1. LITTLE INTEREST OR PLEASURE IN DOING THINGS: 1
SUM OF ALL RESPONSES TO PHQ QUESTIONS 1-9: 2
SUM OF ALL RESPONSES TO PHQ QUESTIONS 1-9: 2
SUM OF ALL RESPONSES TO PHQ9 QUESTIONS 1 & 2: 2
SUM OF ALL RESPONSES TO PHQ QUESTIONS 1-9: 2
SUM OF ALL RESPONSES TO PHQ QUESTIONS 1-9: 2
2. FEELING DOWN, DEPRESSED OR HOPELESS: 1

## 2023-07-21 LAB
AFP L3 MFR SERPL: 7.4 % (ref 0–9.9)
AFP SERPL-MCNC: 4.3 NG/ML (ref 0–9.2)

## 2024-01-31 ENCOUNTER — HOSPITAL ENCOUNTER (OUTPATIENT)
Age: 54
Discharge: HOME | End: 2024-02-03
Payer: COMMERCIAL

## 2024-01-31 DIAGNOSIS — K74.3 PRIMARY BILIARY CIRRHOSIS (HCC): ICD-10-CM

## 2024-01-31 PROCEDURE — 76705 ECHO EXAM OF ABDOMEN: CPT

## 2024-02-05 ENCOUNTER — HOSPITAL ENCOUNTER (OUTPATIENT)
Facility: HOSPITAL | Age: 54
Setting detail: SPECIMEN
Discharge: HOME | End: 2024-02-08
Payer: COMMERCIAL

## 2024-02-05 ENCOUNTER — OFFICE VISIT (OUTPATIENT)
Age: 54
End: 2024-02-05
Payer: COMMERCIAL

## 2024-02-05 VITALS
HEIGHT: 67 IN | WEIGHT: 279.2 LBS | HEART RATE: 82 BPM | TEMPERATURE: 97.6 F | DIASTOLIC BLOOD PRESSURE: 91 MMHG | BODY MASS INDEX: 43.82 KG/M2 | OXYGEN SATURATION: 97 % | RESPIRATION RATE: 20 BRPM | SYSTOLIC BLOOD PRESSURE: 134 MMHG

## 2024-02-05 DIAGNOSIS — K74.3 PRIMARY BILIARY CIRRHOSIS (HCC): ICD-10-CM

## 2024-02-05 DIAGNOSIS — K74.3 PRIMARY BILIARY CIRRHOSIS (HCC): Primary | ICD-10-CM

## 2024-02-05 LAB
ALBUMIN SERPL-MCNC: 3.8 G/DL (ref 3.4–5)
ALBUMIN/GLOB SERPL: 1 (ref 0.8–1.7)
ALP SERPL-CCNC: 190 U/L (ref 45–117)
ALT SERPL-CCNC: 46 U/L (ref 13–56)
ANION GAP SERPL CALC-SCNC: 5 MMOL/L (ref 3–18)
AST SERPL-CCNC: 46 U/L (ref 10–38)
BASOPHILS # BLD: 0.1 K/UL (ref 0–0.1)
BASOPHILS NFR BLD: 1 % (ref 0–2)
BILIRUB DIRECT SERPL-MCNC: 0.2 MG/DL (ref 0–0.2)
BILIRUB SERPL-MCNC: 0.5 MG/DL (ref 0.2–1)
BUN SERPL-MCNC: 9 MG/DL (ref 7–18)
BUN/CREAT SERPL: 11 (ref 12–20)
CALCIUM SERPL-MCNC: 9.5 MG/DL (ref 8.5–10.1)
CHLORIDE SERPL-SCNC: 108 MMOL/L (ref 100–111)
CO2 SERPL-SCNC: 26 MMOL/L (ref 21–32)
CREAT SERPL-MCNC: 0.82 MG/DL (ref 0.6–1.3)
DIFFERENTIAL METHOD BLD: ABNORMAL
EOSINOPHIL # BLD: 0.5 K/UL (ref 0–0.4)
EOSINOPHIL NFR BLD: 4 % (ref 0–5)
ERYTHROCYTE [DISTWIDTH] IN BLOOD BY AUTOMATED COUNT: 13.5 % (ref 11.6–14.5)
GLOBULIN SER CALC-MCNC: 3.9 G/DL (ref 2–4)
GLUCOSE SERPL-MCNC: 110 MG/DL (ref 74–99)
HCT VFR BLD AUTO: 43.9 % (ref 35–45)
HGB BLD-MCNC: 14.1 G/DL (ref 12–16)
IMM GRANULOCYTES # BLD AUTO: 0 K/UL (ref 0–0.04)
IMM GRANULOCYTES NFR BLD AUTO: 0 % (ref 0–0.5)
INR PPP: 1 (ref 0.9–1.1)
LYMPHOCYTES # BLD: 4 K/UL (ref 0.9–3.6)
LYMPHOCYTES NFR BLD: 37 % (ref 21–52)
MCH RBC QN AUTO: 28.5 PG (ref 24–34)
MCHC RBC AUTO-ENTMCNC: 32.1 G/DL (ref 31–37)
MCV RBC AUTO: 88.9 FL (ref 78–100)
MONOCYTES # BLD: 0.9 K/UL (ref 0.05–1.2)
MONOCYTES NFR BLD: 8 % (ref 3–10)
NEUTS SEG # BLD: 5.4 K/UL (ref 1.8–8)
NEUTS SEG NFR BLD: 50 % (ref 40–73)
NRBC # BLD: 0 K/UL (ref 0–0.01)
NRBC BLD-RTO: 0 PER 100 WBC
PLATELET # BLD AUTO: 403 K/UL (ref 135–420)
PMV BLD AUTO: 10.2 FL (ref 9.2–11.8)
POTASSIUM SERPL-SCNC: 4.1 MMOL/L (ref 3.5–5.5)
PROT SERPL-MCNC: 7.7 G/DL (ref 6.4–8.2)
PROTHROMBIN TIME: 13.2 SEC (ref 11.9–14.7)
RBC # BLD AUTO: 4.94 M/UL (ref 4.2–5.3)
SODIUM SERPL-SCNC: 139 MMOL/L (ref 136–145)
WBC # BLD AUTO: 10.9 K/UL (ref 4.6–13.2)

## 2024-02-05 PROCEDURE — 91200 LIVER ELASTOGRAPHY: CPT | Performed by: NURSE PRACTITIONER

## 2024-02-05 PROCEDURE — 80076 HEPATIC FUNCTION PANEL: CPT

## 2024-02-05 PROCEDURE — 36415 COLL VENOUS BLD VENIPUNCTURE: CPT

## 2024-02-05 PROCEDURE — 3075F SYST BP GE 130 - 139MM HG: CPT | Performed by: NURSE PRACTITIONER

## 2024-02-05 PROCEDURE — 85610 PROTHROMBIN TIME: CPT

## 2024-02-05 PROCEDURE — 82107 ALPHA-FETOPROTEIN L3: CPT

## 2024-02-05 PROCEDURE — 85025 COMPLETE CBC W/AUTO DIFF WBC: CPT

## 2024-02-05 PROCEDURE — 99214 OFFICE O/P EST MOD 30 MIN: CPT | Performed by: NURSE PRACTITIONER

## 2024-02-05 PROCEDURE — 3080F DIAST BP >= 90 MM HG: CPT | Performed by: NURSE PRACTITIONER

## 2024-02-05 PROCEDURE — 80048 BASIC METABOLIC PNL TOTAL CA: CPT

## 2024-02-05 NOTE — PROGRESS NOTES
continued monitoring.  She will have the ultrasound completed prior.      BO HauserP-C  [facility] [place] [facility]  [address] [place] [address], [address]   [place] [address] [phone]

## 2024-02-07 LAB
AFP L3 MFR SERPL: 6.1 % (ref 0–9.9)
AFP SERPL-MCNC: 5.5 NG/ML (ref 0–9.2)

## 2024-02-20 ENCOUNTER — CLINICAL DOCUMENTATION (OUTPATIENT)
Age: 54
End: 2024-02-20

## 2024-02-20 NOTE — PROGRESS NOTES
Patient information sent over to the research department by Nicholas Quigley NP for possible enrollment into a clinical research trial for PBC/Cirrhosis. Patient pre-screened for the [employer] AFFIRM research trial for PBC/Cirrhosis. Patient would be excluded due to exclusionary item #9 as patient's liver biopsy shows CAMPOS. Patient was contacted and voicemail left. Confirmed patient has follow up appointment scheduled for 7/15/24 at the [facility] [place].

## 2024-04-23 ENCOUNTER — TELEPHONE (OUTPATIENT)
Age: 54
End: 2024-04-23

## 2024-04-24 RX ORDER — URSODIOL 500 MG/1
1000 TABLET, FILM COATED ORAL 2 TIMES DAILY
Qty: 360 TABLET | Refills: 3 | Status: SHIPPED | OUTPATIENT
Start: 2024-04-24

## 2024-06-25 ENCOUNTER — PREP FOR PROCEDURE (OUTPATIENT)
Age: 54
End: 2024-06-25

## 2024-06-25 DIAGNOSIS — K74.3 PRIMARY BILIARY CIRRHOSIS (HCC): ICD-10-CM

## 2024-07-08 ENCOUNTER — TELEPHONE (OUTPATIENT)
Age: 54
End: 2024-07-08

## 2024-07-15 ENCOUNTER — ANESTHESIA EVENT (OUTPATIENT)
Facility: HOSPITAL | Age: 54
End: 2024-07-15
Payer: COMMERCIAL

## 2024-07-15 ENCOUNTER — HOSPITAL ENCOUNTER (OUTPATIENT)
Facility: HOSPITAL | Age: 54
Setting detail: OUTPATIENT SURGERY
Discharge: HOME | End: 2024-07-15
Attending: INTERNAL MEDICINE | Admitting: INTERNAL MEDICINE
Payer: COMMERCIAL

## 2024-07-15 ENCOUNTER — ANESTHESIA (OUTPATIENT)
Facility: HOSPITAL | Age: 54
End: 2024-07-15
Payer: COMMERCIAL

## 2024-07-15 VITALS
WEIGHT: 271 LBS | RESPIRATION RATE: 18 BRPM | BODY MASS INDEX: 42.53 KG/M2 | TEMPERATURE: 97.1 F | SYSTOLIC BLOOD PRESSURE: 111 MMHG | DIASTOLIC BLOOD PRESSURE: 67 MMHG | HEART RATE: 64 BPM | HEIGHT: 67 IN | OXYGEN SATURATION: 96 %

## 2024-07-15 LAB — GLUCOSE BLD STRIP.AUTO-MCNC: 137 MG/DL (ref 70–110)

## 2024-07-15 PROCEDURE — 7100000010 HC PHASE II RECOVERY - FIRST 15 MIN: Performed by: INTERNAL MEDICINE

## 2024-07-15 PROCEDURE — 3700000001 HC ADD 15 MINUTES (ANESTHESIA): Performed by: INTERNAL MEDICINE

## 2024-07-15 PROCEDURE — 88305 TISSUE EXAM BY PATHOLOGIST: CPT

## 2024-07-15 PROCEDURE — 3600007502: Performed by: INTERNAL MEDICINE

## 2024-07-15 PROCEDURE — 43239 EGD BIOPSY SINGLE/MULTIPLE: CPT | Performed by: INTERNAL MEDICINE

## 2024-07-15 PROCEDURE — 3600007512: Performed by: INTERNAL MEDICINE

## 2024-07-15 PROCEDURE — 82962 GLUCOSE BLOOD TEST: CPT

## 2024-07-15 PROCEDURE — 2709999900 HC NON-CHARGEABLE SUPPLY: Performed by: INTERNAL MEDICINE

## 2024-07-15 PROCEDURE — 7100000011 HC PHASE II RECOVERY - ADDTL 15 MIN: Performed by: INTERNAL MEDICINE

## 2024-07-15 PROCEDURE — 2580000003 HC RX 258: Performed by: NURSE ANESTHETIST, CERTIFIED REGISTERED

## 2024-07-15 PROCEDURE — 2580000003 HC RX 258: Performed by: INTERNAL MEDICINE

## 2024-07-15 PROCEDURE — 3700000000 HC ANESTHESIA ATTENDED CARE: Performed by: INTERNAL MEDICINE

## 2024-07-15 PROCEDURE — 6360000002 HC RX W HCPCS: Performed by: NURSE ANESTHETIST, CERTIFIED REGISTERED

## 2024-07-15 PROCEDURE — 2500000003 HC RX 250 WO HCPCS: Performed by: NURSE ANESTHETIST, CERTIFIED REGISTERED

## 2024-07-15 RX ORDER — SODIUM CHLORIDE 9 MG/ML
INJECTION, SOLUTION INTRAVENOUS PRN
Status: DISCONTINUED | OUTPATIENT
Start: 2024-07-15 | End: 2024-07-15 | Stop reason: HOSPADM

## 2024-07-15 RX ORDER — PAROXETINE HYDROCHLORIDE 40 MG/1
40 TABLET, FILM COATED ORAL EVERY MORNING
COMMUNITY

## 2024-07-15 RX ORDER — SODIUM CHLORIDE 9 MG/ML
INJECTION, SOLUTION INTRAVENOUS CONTINUOUS PRN
Status: DISCONTINUED | OUTPATIENT
Start: 2024-07-15 | End: 2024-07-15 | Stop reason: SDUPTHER

## 2024-07-15 RX ORDER — MELOXICAM 15 MG/1
1 TABLET ORAL DAILY PRN
COMMUNITY
Start: 2024-01-10

## 2024-07-15 RX ORDER — FENTANYL CITRATE 50 UG/ML
25 INJECTION, SOLUTION INTRAMUSCULAR; INTRAVENOUS AS NEEDED
Status: CANCELLED | OUTPATIENT
Start: 2024-07-15

## 2024-07-15 RX ORDER — CYCLOBENZAPRINE HCL 10 MG
10 TABLET ORAL AS NEEDED
COMMUNITY
Start: 2024-05-17

## 2024-07-15 RX ORDER — PROPOFOL 10 MG/ML
INJECTION, EMULSION INTRAVENOUS PRN
Status: DISCONTINUED | OUTPATIENT
Start: 2024-07-15 | End: 2024-07-15 | Stop reason: SDUPTHER

## 2024-07-15 RX ORDER — FEXOFENADINE HCL 180 MG/1
180 TABLET ORAL DAILY
COMMUNITY
Start: 2015-01-14

## 2024-07-15 RX ORDER — SODIUM CHLORIDE 0.9 % (FLUSH) 0.9 %
5-40 SYRINGE (ML) INJECTION PRN
Status: CANCELLED | OUTPATIENT
Start: 2024-07-15

## 2024-07-15 RX ORDER — PAROXETINE HCL 10 MG
TABLET ORAL
COMMUNITY

## 2024-07-15 RX ORDER — SODIUM CHLORIDE 0.9 % (FLUSH) 0.9 %
5-40 SYRINGE (ML) INJECTION EVERY 12 HOURS SCHEDULED
Status: CANCELLED | OUTPATIENT
Start: 2024-07-15

## 2024-07-15 RX ORDER — LIDOCAINE HYDROCHLORIDE 20 MG/ML
INJECTION, SOLUTION EPIDURAL; INFILTRATION; INTRACAUDAL; PERINEURAL PRN
Status: DISCONTINUED | OUTPATIENT
Start: 2024-07-15 | End: 2024-07-15 | Stop reason: SDUPTHER

## 2024-07-15 RX ORDER — ONDANSETRON 2 MG/ML
2 INJECTION INTRAMUSCULAR; INTRAVENOUS AS NEEDED
Status: CANCELLED | OUTPATIENT
Start: 2024-07-15

## 2024-07-15 RX ORDER — DEXMEDETOMIDINE HYDROCHLORIDE 100 UG/ML
INJECTION, SOLUTION INTRAVENOUS PRN
Status: DISCONTINUED | OUTPATIENT
Start: 2024-07-15 | End: 2024-07-15 | Stop reason: SDUPTHER

## 2024-07-15 RX ADMIN — LIDOCAINE HYDROCHLORIDE 60 MG: 20 INJECTION, SOLUTION EPIDURAL; INFILTRATION; INTRACAUDAL; PERINEURAL at 09:13

## 2024-07-15 RX ADMIN — DEXMEDETOMIDINE HYDROCHLORIDE 10 MCG: 100 INJECTION, SOLUTION INTRAVENOUS at 09:13

## 2024-07-15 RX ADMIN — SODIUM CHLORIDE: 9 INJECTION, SOLUTION INTRAVENOUS at 08:25

## 2024-07-15 RX ADMIN — SODIUM CHLORIDE: 900 INJECTION, SOLUTION INTRAVENOUS at 09:02

## 2024-07-15 RX ADMIN — PROPOFOL 100 MG: 10 INJECTION, EMULSION INTRAVENOUS at 09:13

## 2024-07-15 ASSESSMENT — PAIN - FUNCTIONAL ASSESSMENT
PAIN_FUNCTIONAL_ASSESSMENT: NONE - DENIES PAIN
PAIN_FUNCTIONAL_ASSESSMENT: NONE - DENIES PAIN
PAIN_FUNCTIONAL_ASSESSMENT: 0-10

## 2024-07-15 NOTE — H&P
[facility]      Schuyler Kim MD, FACP, FACG, FAASLD      WESLY John, [facility]   Farrah Alcira, [facility]   Hilda Tenorio, [facility]  Garcia Quigley, [facility]   Amber Murdock, [facility]   María Aston, [facility]   [address], [address]   [place]  [address] [phone]   FAX: [phone]  [facility]   [address], [address]   [place]  [address] [phone]   FAX: [phone]         PRE-PROCEDURE NOTE - EGD    H and P from last office visit reviewed.    Allergies reviewed.  Out-patient medicaton list reviewed.    Patient Active Problem List   Diagnosis    History of cholecystectomy    Hypertension    Primary biliary cholangitis (HCC)    Ventral hernia    Anxiety    Cirrhosis (HCC)    Asthma    GERD (gastroesophageal reflux disease)    CAMPOS (nonalcoholic steatohepatitis)    Primary biliary cirrhosis (HCC)         Allergies   Allergen Reactions    Lisinopril Anaphylaxis    Symbicort [Budesonide-Formoterol Fumarate] Other (See Comments)     thrush    Ciprofibrate Nausea And Vomiting       No current facility-administered medications on file prior to encounter.     Current Outpatient Medications on File Prior to Encounter   Medication Sig Dispense Refill    PARoxetine (PAXIL) 40 MG tablet Take 1 tablet by mouth every morning      meloxicam (MOBIC) 15 MG tablet Take 1 tablet by mouth daily as needed      fexofenadine (ALLEGRA ALLERGY) 180 MG tablet Take 1 tablet by mouth daily      MULTIPLE VITAMIN PO take 1 tablet by oral route  every day with food      cyclobenzaprine (FLEXERIL) 10 MG tablet Take 1 tablet by mouth as needed      PAXIL 10 MG tablet       ursodiol (ACTIGALL) 500 MG tablet Take 2 tablets by mouth 2 times daily 360 tablet 3    amLODIPine (NORVASC)

## 2024-07-15 NOTE — DISCHARGE INSTRUCTIONS
[facility]      Schuyler Kim MD, FACP, FACG, FAASLD      Marielena Avila, WESLY Urbina, [facility]   Farrah Rowleyjenniferdebra, [facility]   Hilda Jona, [facility]  Garcia Quigley, [facility]   Amber Murdock, [facility]   María Rioson, [facility]   [address], [address]   [place]  [address] [phone]   FAX: [phone]  [facility]   [address], [address]   [place]  [address] [phone]   FAX: [phone]         ENDOSCOPY DISCHARGE INSTRUCTIONS      Teresa L Sturgeon  1970  Date: 7/15/2024    DISCOMFORT:  Use lozenges or warm salt water gargle for sore thoat  Apply warm compress to IV site if red.  If redness or soreness persists call the office.  You may experience gas and bloating.  Walking and belching will help relieve this.  You may experience chest discomfort or pressure especially if banding of esophageal varices was performed.    DIET:  You may resume your normal diet.    ACTIVITY:  Spend the remainder of the day resting.  Avoid any strenuous activity.  You may not operate a vehicle for 12 hours.  You may not engage in an occupation involving machinery or appliances for rest of today.   Avoid making any critical or financial decisions for at least 24 hour.    Call the Liver [place] [facility] Office if you have any of the following:  Increasing chest or abdominal pain, nausea, vomiting, vomiting blood, abdominal distension or swelling, fever or chills, bloody discharge from nose or mouth or shortness of breath.    Follow-up Instructions:  Call Dr. Kim for any questions or problems at the phone number listed above.  If a biopsy was performed, you will be contacted by the office staff or Dr Kim within 1 week.   If you have not heard from us by then you may call

## 2024-07-15 NOTE — ANESTHESIA PRE PROCEDURE
Department of Anesthesiology  Preprocedure Note       Name:  Teresa L Sturgeon   Age:  53 y.o.  :  1970                                          MRN:  [id number]         Date:  7/15/2024      Surgeon: Surgeon(s):  Schuyler Kim MD    Procedure: Procedure(s):  ESOPHAGOGASTRODUODENOSCOPY    Medications prior to admission:   Prior to Admission medications    Medication Sig Start Date End Date Taking? Authorizing Provider   PARoxetine (PAXIL) 40 MG tablet Take 1 tablet by mouth every morning   Yes Provider, MD Darcy   meloxicam (MOBIC) 15 MG tablet Take 1 tablet by mouth daily as needed 1/10/24  Yes Provider, MD Darcy   fexofenadine (ALLEGRA ALLERGY) 180 MG tablet Take 1 tablet by mouth daily 1/14/15  Yes Provider, MD Darcy   MULTIPLE VITAMIN PO take 1 tablet by oral route  every day with food 24  Yes Provider, MD Dacry   cyclobenzaprine (FLEXERIL) 10 MG tablet Take 1 tablet by mouth as needed 24  Yes ProviderDarcy MD   PAXIL 10 MG tablet     Provider, MD Darcy   ursodiol (ACTIGALL) 500 MG tablet Take 2 tablets by mouth 2 times daily 24   Garcia Quigley, APRN - CNP   amLODIPine (NORVASC) 5 MG tablet Take 1 tablet by mouth daily    Automatic Reconciliation, Ar   furosemide (LASIX) 20 MG tablet Take 1 tablet by mouth as needed  Patient not taking: Reported on 7/15/2024 8/18/22   Automatic Reconciliation, Ar   losartan (COZAAR) 100 MG tablet 1 tablet daily 10/21/20   Automatic Reconciliation, Ar   spironolactone (ALDACTONE) 50 MG tablet Take 1 tablet by mouth as needed  Patient not taking: Reported on 7/15/2024 8/18/22   Automatic Reconciliation, Ar       Current medications:    Current Facility-Administered Medications   Medication Dose Route Frequency Provider Last Rate Last Admin    0.9 % sodium chloride infusion   IntraVENous PRN Schuyler Kim  mL/hr at 07/15/24 0825 New Bag at 07/15/24 0825       Allergies:    Allergies   Allergen

## 2024-07-15 NOTE — ANESTHESIA POSTPROCEDURE EVALUATION
Department of Anesthesiology  Postprocedure Note    Patient: Teresa L Sturgeon  MRN: [id number]  YOB: 1970  Date of evaluation: 7/15/2024    Procedure Summary       Date: 07/15/24 Room / Location: [place] [address] / [facility] ENDOSCOPY    Anesthesia Start: 0909 Anesthesia Stop: 0925    Procedure: ESOPHAGOGASTRODUODENOSCOPY; GASTRIC BIOPSY (Upper GI Region) Diagnosis:       Primary biliary cirrhosis (HCC)      (Primary biliary cirrhosis (HCC) [K74.3])    Surgeons: Schuyler Kim MD Responsible Provider: Guy Napier MD    Anesthesia Type: MAC ASA Status: 3            Anesthesia Type: MAC    Daniel Phase I: Daniel Score: 10    Daniel Phase II: Daniel Score: 10    Anesthesia Post Evaluation    Patient location during evaluation: PACU  Patient participation: complete - patient participated  Level of consciousness: awake and alert  Airway patency: patent  Nausea & Vomiting: no nausea and no vomiting  Cardiovascular status: blood pressure returned to baseline  Respiratory status: acceptable  Hydration status: euvolemic  Pain management: adequate    No notable events documented.

## 2024-07-15 NOTE — OP NOTE
[facility]      Schuyler Kim MD, FACP, FACG, FAASLD      Marielena Avila, WESLY [place], [facility]   Farrah Rowleyjenniferdebra, [facility]   iHlda Tenorio, [facility]-  Garcia Quigley, [facility]   Amber Murdock, [facility]   María Greenfield, [facility]   [address], [address]   [place]  [address] [phone]   FAX: [phone]  [facility]   [address], [address]   [place]  [address] [phone]   FAX: [phone]         UPPER ENDOSCOPY PROCEDURE NOTE    NAME: Teresa L Sturgeon  :  1970  MRN:  [id number]    INDICATION: Cirrhosis.  Screening for esophageal varices with variceal ligation if  indicated.    : Schuyler Kim MD    SURGICAL ASSISTANT:  None    PROSTHETIC DEVISES, TISSUE GRAFTS, ORGAN TRANSPLANTS:  Not applicable     ANESTHESIA/SEDATION: MAC Sedation per anesthesiology          PROCEDURE DESCRIPTION:  [employer] consent was obtained from the patient for the procedure.  All risks and benefits of the procedure explained.     The procedure was performed in the endoscopy suite.  The patient was laying on a stretcher and moved to the left lateral decubitus position prior to administration of sedation.    Sedation was administered by anesthesiology.  See their note for details.      The endoscope was inserted into the mouth and advanced under direct vision to the second portion of the duodenum.  Careful inspection of upper gastrointestinal tract was made as the endoscope was inserted and withdrawn.  Retroflexion of the endoscope to view of the cardia of the stomach was performed.  The findings and interventions are described below.      FINDINGS:  Esophagus:    Normal.      Stomach:   Gastritis of the antrum  No gastric varices identified.    Duodenum:   Normal bulb and  General

## 2024-07-19 ENCOUNTER — HOSPITAL ENCOUNTER (OUTPATIENT)
Age: 54
Discharge: HOME | End: 2024-07-19
Payer: COMMERCIAL

## 2024-07-19 DIAGNOSIS — K74.3 PRIMARY BILIARY CIRRHOSIS (HCC): ICD-10-CM

## 2024-07-19 PROCEDURE — 76705 ECHO EXAM OF ABDOMEN: CPT

## 2024-08-07 ENCOUNTER — OFFICE VISIT (OUTPATIENT)
Age: 54
End: 2024-08-07
Payer: COMMERCIAL

## 2024-08-07 ENCOUNTER — HOSPITAL ENCOUNTER (OUTPATIENT)
Facility: HOSPITAL | Age: 54
Setting detail: SPECIMEN
Discharge: HOME | End: 2024-08-10
Payer: COMMERCIAL

## 2024-08-07 VITALS
BODY MASS INDEX: 41.44 KG/M2 | DIASTOLIC BLOOD PRESSURE: 85 MMHG | OXYGEN SATURATION: 98 % | WEIGHT: 264 LBS | SYSTOLIC BLOOD PRESSURE: 118 MMHG | TEMPERATURE: 97.2 F | HEART RATE: 82 BPM | HEIGHT: 67 IN

## 2024-08-07 DIAGNOSIS — K74.3 PRIMARY BILIARY CIRRHOSIS (HCC): Primary | ICD-10-CM

## 2024-08-07 DIAGNOSIS — K74.3 PRIMARY BILIARY CIRRHOSIS (HCC): ICD-10-CM

## 2024-08-07 PROBLEM — E11.9 DIABETES MELLITUS (HCC): Status: ACTIVE | Noted: 2024-08-07

## 2024-08-07 LAB
ALBUMIN SERPL-MCNC: 4.2 G/DL (ref 3.4–5)
ALBUMIN/GLOB SERPL: 1.2 (ref 0.8–1.7)
ALP SERPL-CCNC: 194 U/L (ref 45–117)
ALT SERPL-CCNC: 63 U/L (ref 13–56)
ANION GAP SERPL CALC-SCNC: 10 MMOL/L (ref 3–18)
AST SERPL-CCNC: 58 U/L (ref 10–38)
BASOPHILS # BLD: 0.1 K/UL (ref 0–0.1)
BASOPHILS NFR BLD: 2 % (ref 0–2)
BILIRUB DIRECT SERPL-MCNC: 0.2 MG/DL (ref 0–0.2)
BILIRUB SERPL-MCNC: 0.5 MG/DL (ref 0.2–1)
BUN SERPL-MCNC: 14 MG/DL (ref 7–18)
BUN/CREAT SERPL: 19 (ref 12–20)
CALCIUM SERPL-MCNC: 9.4 MG/DL (ref 8.5–10.1)
CHLORIDE SERPL-SCNC: 109 MMOL/L (ref 100–111)
CO2 SERPL-SCNC: 21 MMOL/L (ref 21–32)
CREAT SERPL-MCNC: 0.73 MG/DL (ref 0.6–1.3)
DIFFERENTIAL METHOD BLD: NORMAL
EOSINOPHIL # BLD: 0.4 K/UL (ref 0–0.4)
EOSINOPHIL NFR BLD: 5 % (ref 0–5)
ERYTHROCYTE [DISTWIDTH] IN BLOOD BY AUTOMATED COUNT: 14.2 % (ref 11.6–14.5)
GLOBULIN SER CALC-MCNC: 3.6 G/DL (ref 2–4)
GLUCOSE SERPL-MCNC: 120 MG/DL (ref 74–99)
HCT VFR BLD AUTO: 44.5 % (ref 35–45)
HGB BLD-MCNC: 14.3 G/DL (ref 12–16)
IMM GRANULOCYTES # BLD AUTO: 0 K/UL (ref 0–0.04)
IMM GRANULOCYTES NFR BLD AUTO: 0 % (ref 0–0.5)
INR PPP: 0.9 (ref 0.9–1.1)
LYMPHOCYTES # BLD: 3.5 K/UL (ref 0.9–3.6)
LYMPHOCYTES NFR BLD: 41 % (ref 21–52)
MCH RBC QN AUTO: 29 PG (ref 24–34)
MCHC RBC AUTO-ENTMCNC: 32.1 G/DL (ref 31–37)
MCV RBC AUTO: 90.3 FL (ref 78–100)
MONOCYTES # BLD: 0.8 K/UL (ref 0.05–1.2)
MONOCYTES NFR BLD: 9 % (ref 3–10)
NEUTS SEG # BLD: 3.7 K/UL (ref 1.8–8)
NEUTS SEG NFR BLD: 43 % (ref 40–73)
NRBC # BLD: 0 K/UL (ref 0–0.01)
NRBC BLD-RTO: 0 PER 100 WBC
PLATELET # BLD AUTO: 377 K/UL (ref 135–420)
PMV BLD AUTO: 10.4 FL (ref 9.2–11.8)
POTASSIUM SERPL-SCNC: 4.4 MMOL/L (ref 3.5–5.5)
PROT SERPL-MCNC: 7.8 G/DL (ref 6.4–8.2)
PROTHROMBIN TIME: 12.8 SEC (ref 11.9–14.9)
RBC # BLD AUTO: 4.93 M/UL (ref 4.2–5.3)
SODIUM SERPL-SCNC: 140 MMOL/L (ref 136–145)
WBC # BLD AUTO: 8.6 K/UL (ref 4.6–13.2)

## 2024-08-07 PROCEDURE — 85610 PROTHROMBIN TIME: CPT

## 2024-08-07 PROCEDURE — 36415 COLL VENOUS BLD VENIPUNCTURE: CPT

## 2024-08-07 PROCEDURE — 3079F DIAST BP 80-89 MM HG: CPT | Performed by: NURSE PRACTITIONER

## 2024-08-07 PROCEDURE — 80076 HEPATIC FUNCTION PANEL: CPT

## 2024-08-07 PROCEDURE — 3074F SYST BP LT 130 MM HG: CPT | Performed by: NURSE PRACTITIONER

## 2024-08-07 PROCEDURE — 99214 OFFICE O/P EST MOD 30 MIN: CPT | Performed by: NURSE PRACTITIONER

## 2024-08-07 PROCEDURE — 85025 COMPLETE CBC W/AUTO DIFF WBC: CPT

## 2024-08-07 PROCEDURE — 82107 ALPHA-FETOPROTEIN L3: CPT

## 2024-08-07 PROCEDURE — 80048 BASIC METABOLIC PNL TOTAL CA: CPT

## 2024-08-07 NOTE — PROGRESS NOTES
OTHER TESTING:  Not available or performed    FOLLOW-UP:  All of the issues listed above in the Assessment and Plan were discussed with the patient.  All questions were answered.  The patient expressed a clear understanding of the above.    Follow-up [facility] in 6 months for continued monitoring.  She will have the ultrasound completed prior.      BO HauserP-C  [facility]  [address], [address]   [place] [address] [phone]

## 2024-08-12 LAB
AFP L3 MFR SERPL: NORMAL % (ref 0–9.9)
AFP SERPL-MCNC: 4.3 NG/ML (ref 0–9.2)

## 2025-01-22 ENCOUNTER — HOSPITAL ENCOUNTER (OUTPATIENT)
Age: 55
Discharge: HOME | End: 2025-01-25
Payer: COMMERCIAL

## 2025-01-22 DIAGNOSIS — K74.3 PRIMARY BILIARY CIRRHOSIS (HCC): ICD-10-CM

## 2025-01-22 PROCEDURE — 76705 ECHO EXAM OF ABDOMEN: CPT

## 2025-02-04 ENCOUNTER — TELEPHONE (OUTPATIENT)
Age: 55
End: 2025-02-04

## 2025-02-04 NOTE — TELEPHONE ENCOUNTER
----- Message from DEL Mayer CNP sent at 2/3/2025  8:30 AM EST -----  Please call with unremarkable ultrasound results.  No liver lesions.  Thank you.

## 2025-02-06 ENCOUNTER — OFFICE VISIT (OUTPATIENT)
Age: 55
End: 2025-02-06
Payer: COMMERCIAL

## 2025-02-06 ENCOUNTER — HOSPITAL ENCOUNTER (OUTPATIENT)
Facility: HOSPITAL | Age: 55
Setting detail: SPECIMEN
Discharge: HOME | End: 2025-02-09
Payer: COMMERCIAL

## 2025-02-06 VITALS
DIASTOLIC BLOOD PRESSURE: 91 MMHG | HEART RATE: 83 BPM | BODY MASS INDEX: 40.02 KG/M2 | WEIGHT: 255 LBS | OXYGEN SATURATION: 97 % | RESPIRATION RATE: 14 BRPM | HEIGHT: 67 IN | SYSTOLIC BLOOD PRESSURE: 119 MMHG | TEMPERATURE: 97.3 F

## 2025-02-06 DIAGNOSIS — K74.3 PRIMARY BILIARY CIRRHOSIS (HCC): ICD-10-CM

## 2025-02-06 DIAGNOSIS — K74.3 PRIMARY BILIARY CIRRHOSIS (HCC): Primary | ICD-10-CM

## 2025-02-06 LAB
ALBUMIN SERPL-MCNC: 4.2 G/DL (ref 3.4–5)
ALBUMIN/GLOB SERPL: 1.1 (ref 0.8–1.7)
ALP SERPL-CCNC: 207 U/L (ref 45–117)
ALT SERPL-CCNC: 38 U/L (ref 13–56)
AMMONIA PLAS-SCNC: 24 UMOL/L (ref 11–32)
ANION GAP SERPL CALC-SCNC: 7 MMOL/L (ref 3–18)
AST SERPL-CCNC: 40 U/L (ref 10–38)
BASOPHILS # BLD: 0.11 K/UL (ref 0–0.1)
BASOPHILS NFR BLD: 1.1 % (ref 0–2)
BILIRUB DIRECT SERPL-MCNC: 0.2 MG/DL (ref 0–0.2)
BILIRUB SERPL-MCNC: 0.5 MG/DL (ref 0.2–1)
BUN SERPL-MCNC: 15 MG/DL (ref 7–18)
BUN/CREAT SERPL: 21 (ref 12–20)
CALCIUM SERPL-MCNC: 9.8 MG/DL (ref 8.5–10.1)
CHLORIDE SERPL-SCNC: 106 MMOL/L (ref 100–111)
CO2 SERPL-SCNC: 25 MMOL/L (ref 21–32)
CREAT SERPL-MCNC: 0.72 MG/DL (ref 0.6–1.3)
DIFFERENTIAL METHOD BLD: ABNORMAL
EOSINOPHIL # BLD: 0.36 K/UL (ref 0–0.4)
EOSINOPHIL NFR BLD: 3.4 % (ref 0–5)
ERYTHROCYTE [DISTWIDTH] IN BLOOD BY AUTOMATED COUNT: 14.2 % (ref 11.6–14.5)
GLOBULIN SER CALC-MCNC: 3.8 G/DL (ref 2–4)
GLUCOSE SERPL-MCNC: 107 MG/DL (ref 74–99)
HCT VFR BLD AUTO: 46.9 % (ref 35–45)
HGB BLD-MCNC: 14.4 G/DL (ref 12–16)
IMM GRANULOCYTES # BLD AUTO: 0.03 K/UL (ref 0–0.04)
IMM GRANULOCYTES NFR BLD AUTO: 0.3 % (ref 0–0.5)
INR PPP: 0.9 (ref 0.9–1.1)
LYMPHOCYTES # BLD: 4.67 K/UL (ref 0.9–3.3)
LYMPHOCYTES NFR BLD: 44.7 % (ref 21–52)
MCH RBC QN AUTO: 27.9 PG (ref 24–34)
MCHC RBC AUTO-ENTMCNC: 30.7 G/DL (ref 31–37)
MCV RBC AUTO: 90.9 FL (ref 78–100)
MONOCYTES # BLD: 0.7 K/UL (ref 0.05–1.2)
MONOCYTES NFR BLD: 6.7 % (ref 3–10)
NEUTS SEG # BLD: 4.58 K/UL (ref 1.8–8)
NEUTS SEG NFR BLD: 43.8 % (ref 40–73)
NRBC # BLD: 0 K/UL (ref 0–0.01)
NRBC BLD-RTO: 0 PER 100 WBC
PLATELET # BLD AUTO: 401 K/UL (ref 135–420)
PMV BLD AUTO: 10.3 FL (ref 9.2–11.8)
POTASSIUM SERPL-SCNC: 4.5 MMOL/L (ref 3.5–5.5)
PROT SERPL-MCNC: 8 G/DL (ref 6.4–8.2)
PROTHROMBIN TIME: 12.6 SEC (ref 11.9–14.9)
RBC # BLD AUTO: 5.16 M/UL (ref 4.2–5.3)
SODIUM SERPL-SCNC: 138 MMOL/L (ref 136–145)
WBC # BLD AUTO: 10.5 K/UL (ref 4.6–13.2)

## 2025-02-06 PROCEDURE — 36415 COLL VENOUS BLD VENIPUNCTURE: CPT

## 2025-02-06 PROCEDURE — 80076 HEPATIC FUNCTION PANEL: CPT

## 2025-02-06 PROCEDURE — 3080F DIAST BP >= 90 MM HG: CPT | Performed by: NURSE PRACTITIONER

## 2025-02-06 PROCEDURE — 85025 COMPLETE CBC W/AUTO DIFF WBC: CPT

## 2025-02-06 PROCEDURE — 80048 BASIC METABOLIC PNL TOTAL CA: CPT

## 2025-02-06 PROCEDURE — 3074F SYST BP LT 130 MM HG: CPT | Performed by: NURSE PRACTITIONER

## 2025-02-06 PROCEDURE — 82140 ASSAY OF AMMONIA: CPT

## 2025-02-06 PROCEDURE — 99214 OFFICE O/P EST MOD 30 MIN: CPT | Performed by: NURSE PRACTITIONER

## 2025-02-06 PROCEDURE — 82107 ALPHA-FETOPROTEIN L3: CPT

## 2025-02-06 PROCEDURE — 85610 PROTHROMBIN TIME: CPT

## 2025-02-06 RX ORDER — SEMAGLUTIDE 0.68 MG/ML
0.25 INJECTION, SOLUTION SUBCUTANEOUS
COMMUNITY
Start: 2024-12-03

## 2025-02-06 RX ORDER — URSODIOL 500 MG/1
1000 TABLET, FILM COATED ORAL 2 TIMES DAILY
Qty: 360 TABLET | Refills: 3 | Status: SHIPPED | OUTPATIENT
Start: 2025-02-06

## 2025-02-06 NOTE — PROGRESS NOTES
confirms cirrhosis and fatty liver disease.    Multiple scans since have all indicated cirrhosis and hepatic steatosis.      The patient has the following symptoms which are thought to be due to the liver disease:  fatigue, itching.    The patient is not currently experiencing the following symptoms of liver disease:  dry eyes, dry mouth, arthralgias, myalgias, swelling of the lower extremities, hematemesis, hematochezia.    The patient has mild limitations in functional activities which can be attributed to the liver disease.      Since the last office appointment:  Had ultrasound of the liver.    Has lost about 10 pounds.      ASSESSMENT AND PLAN:  Primary Biliary Cholangitis  The diagnosis is based upon liver biopsy, serology, and an elevation in ALP.    The most recent laboratory studies indicate that the AST is elevated, the ALT is normal, the alkaline phosphatase is elevated, tests of hepatic synthetic and metabolic function are normal, and the platelet count is normal.      A liver biopsy was performed in 1/2021.  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    Fibroscan analysis has consistently indicated both cirrhosis and fatty liver.       The fibroscan will be repeated annually or as often as is clinically indicated to document fibrosis regression or progression.      The most recent laboratory studies indicate that the liver transaminases are elevated, alkaline phosphatase is elevated, tests of hepatic synthetic and metabolic function are normal, and the platelet count is normal.        The patient was started on GEOFF in 01/2021 at a dose of 1000 mg BID.    The side effects of GEOFF including a 2% risk of itching and a 2% risk of diarrhea were discussed.  The patient is tolerating the treatment well without any side effects.      The patient is not responding to treatment.  The ALP has not come down to near normal values.  Will add Seladelpar 10 mg caps  Q day.

## 2025-02-14 LAB
AFP L3 MFR SERPL: NORMAL % (ref 0–9.9)
AFP SERPL-MCNC: 4.1 NG/ML (ref 0–9.2)

## 2025-07-10 ENCOUNTER — HOSPITAL ENCOUNTER (OUTPATIENT)
Age: 55
Discharge: HOME | End: 2025-07-13
Payer: COMMERCIAL

## 2025-07-10 DIAGNOSIS — K74.3 PRIMARY BILIARY CIRRHOSIS (HCC): ICD-10-CM

## 2025-07-10 PROCEDURE — 76705 ECHO EXAM OF ABDOMEN: CPT

## (undated) DEVICE — MOUTHPIECE ENDOSCP 20X27MM --

## (undated) DEVICE — FORCEPS BX CAP 240CM L RAD JAW 4

## (undated) DEVICE — DRAPE,APERTURE,MINOR PROCEDURE: Brand: MEDLINE

## (undated) DEVICE — KENDALL RADIOLUCENT FOAM MONITORING ELECTRODE RECTANGULAR SHAPE: Brand: KENDALL

## (undated) DEVICE — TRAP SPEC COLL POLYP POLYSTYR --

## (undated) DEVICE — SPONGE GZ W4XL4IN COT 12 PLY TYP VII WVN C FLD DSGN

## (undated) DEVICE — (D)BNDG ADHESIVE FABRIC 3/4X3 -- DISC BY MFR USE ITEM 357960

## (undated) DEVICE — SINGLE PORT MANIFOLD: Brand: NEPTUNE 2

## (undated) DEVICE — PAD NON-ADHERENT 3X4 STRL LF --

## (undated) DEVICE — HYPODERMIC SAFETY NEEDLE: Brand: MAGELLAN

## (undated) DEVICE — ENDO CARRY-ON PROCEDURE KIT INCLUDES ENZYMATIC SPONGE, GAUZE, BIOHAZARD LABEL, TRAY, LUBRICANT, DIRTY SCOPE LABEL, WATER LABEL, TRAY, DRAWSTRING PAD, AND DEFENDO 4-PIECE KIT.: Brand: ENDO CARRY-ON PROCEDURE KIT

## (undated) DEVICE — MEDI-VAC NON-CONDUCTIVE SUCTION TUBING: Brand: CARDINAL HEALTH

## (undated) DEVICE — MOUTHPIECE ENDOSCP L CTRL OPN AND SIDE PORTS DISP

## (undated) DEVICE — MAJ-1414 SINGLE USE ADPATER BIOPSY VALV: Brand: SINGLE USE ADAPTOR BIOPSY VALVE

## (undated) DEVICE — (D)SYR 10ML 1/5ML GRAD NSAF -- PKGING CHANGE USE ITEM 338027

## (undated) DEVICE — SKIN MARKER,REGULAR TIP WITH RULER AND LABELS: Brand: DEVON

## (undated) DEVICE — INTENDED FOR TISSUE SEPARATION, AND OTHER PROCEDURES THAT REQUIRE A SHARP SURGICAL BLADE TO PUNCTURE OR CUT.: Brand: BARD-PARKER ®  SAFETY SCALPED

## (undated) DEVICE — MAYO STAND COVER: Brand: CONVERTORS

## (undated) DEVICE — SOL IRRIGATION INJ NACL 0.9% 500ML BTL

## (undated) DEVICE — MAX-CORE® DISPOSABLE CORE BIOPSY INSTRUMENT, 16G X 16CM: Brand: MAX-CORE

## (undated) DEVICE — TUBING SUCT L12FT DIA0.25IN CLR W/ MAXI-GRIP AND M/M CONN

## (undated) DEVICE — TRAY PREP DRY W/ PREM GLV 2 APPL 6 SPNG 2 UNDPD 1 OVERWRAP